# Patient Record
Sex: MALE | Race: WHITE | Employment: OTHER | URBAN - METROPOLITAN AREA
[De-identification: names, ages, dates, MRNs, and addresses within clinical notes are randomized per-mention and may not be internally consistent; named-entity substitution may affect disease eponyms.]

---

## 2017-04-10 ENCOUNTER — OFFICE VISIT (OUTPATIENT)
Dept: FAMILY MEDICINE CLINIC | Age: 77
End: 2017-04-10

## 2017-04-10 VITALS
OXYGEN SATURATION: 97 % | HEIGHT: 65 IN | RESPIRATION RATE: 18 BRPM | DIASTOLIC BLOOD PRESSURE: 68 MMHG | WEIGHT: 200 LBS | BODY MASS INDEX: 33.32 KG/M2 | SYSTOLIC BLOOD PRESSURE: 132 MMHG | HEART RATE: 72 BPM | TEMPERATURE: 98.2 F

## 2017-04-10 DIAGNOSIS — I10 ESSENTIAL HYPERTENSION: Primary | ICD-10-CM

## 2017-04-10 DIAGNOSIS — I48.20 CHRONIC ATRIAL FIBRILLATION (HCC): ICD-10-CM

## 2017-04-10 DIAGNOSIS — E78.2 MIXED HYPERLIPIDEMIA: ICD-10-CM

## 2017-04-10 DIAGNOSIS — I25.10 CORONARY ARTERY DISEASE INVOLVING NATIVE HEART WITHOUT ANGINA PECTORIS, UNSPECIFIED VESSEL OR LESION TYPE: ICD-10-CM

## 2017-04-10 PROCEDURE — 3017F COLORECTAL CA SCREEN DOC REV: CPT | Performed by: FAMILY MEDICINE

## 2017-04-10 PROCEDURE — 1123F ACP DISCUSS/DSCN MKR DOCD: CPT | Performed by: FAMILY MEDICINE

## 2017-04-10 PROCEDURE — G8598 ASA/ANTIPLAT THER USED: HCPCS | Performed by: FAMILY MEDICINE

## 2017-04-10 PROCEDURE — G8417 CALC BMI ABV UP PARAM F/U: HCPCS | Performed by: FAMILY MEDICINE

## 2017-04-10 PROCEDURE — 1036F TOBACCO NON-USER: CPT | Performed by: FAMILY MEDICINE

## 2017-04-10 PROCEDURE — 99214 OFFICE O/P EST MOD 30 MIN: CPT | Performed by: FAMILY MEDICINE

## 2017-04-10 PROCEDURE — G8427 DOCREV CUR MEDS BY ELIG CLIN: HCPCS | Performed by: FAMILY MEDICINE

## 2017-04-10 PROCEDURE — 4040F PNEUMOC VAC/ADMIN/RCVD: CPT | Performed by: FAMILY MEDICINE

## 2017-04-10 RX ORDER — TELMISARTAN 80 MG/1
80 TABLET ORAL DAILY
COMMUNITY
Start: 2017-03-04

## 2017-04-10 ASSESSMENT — ENCOUNTER SYMPTOMS
EYES NEGATIVE: 1
ALLERGIC/IMMUNOLOGIC NEGATIVE: 1
GASTROINTESTINAL NEGATIVE: 1
SHORTNESS OF BREATH: 0
RESPIRATORY NEGATIVE: 1

## 2017-05-09 ENCOUNTER — TELEPHONE (OUTPATIENT)
Dept: ADMINISTRATIVE | Age: 77
End: 2017-05-09

## 2017-10-02 DIAGNOSIS — E55.9 VITAMIN D DEFICIENCY: ICD-10-CM

## 2017-10-02 DIAGNOSIS — I10 ESSENTIAL HYPERTENSION: Primary | ICD-10-CM

## 2017-10-02 DIAGNOSIS — E29.1 TESTICULAR HYPOFUNCTION: ICD-10-CM

## 2017-10-02 DIAGNOSIS — Z12.5 PROSTATE CANCER SCREENING: ICD-10-CM

## 2017-10-02 DIAGNOSIS — E78.2 MIXED HYPERLIPIDEMIA: ICD-10-CM

## 2017-10-03 DIAGNOSIS — E29.1 TESTICULAR HYPOFUNCTION: ICD-10-CM

## 2017-10-03 DIAGNOSIS — Z12.5 PROSTATE CANCER SCREENING: ICD-10-CM

## 2017-10-03 DIAGNOSIS — E78.2 MIXED HYPERLIPIDEMIA: ICD-10-CM

## 2017-10-03 DIAGNOSIS — I10 ESSENTIAL HYPERTENSION: ICD-10-CM

## 2017-10-03 DIAGNOSIS — E55.9 VITAMIN D DEFICIENCY: ICD-10-CM

## 2017-10-03 LAB
ALBUMIN SERPL-MCNC: 4.1 G/DL (ref 3.9–4.9)
ALP BLD-CCNC: 67 U/L (ref 35–104)
ALT SERPL-CCNC: 34 U/L (ref 0–41)
ANION GAP SERPL CALCULATED.3IONS-SCNC: 15 MEQ/L (ref 7–13)
AST SERPL-CCNC: 31 U/L (ref 0–40)
BASOPHILS ABSOLUTE: 0 K/UL (ref 0–0.2)
BASOPHILS RELATIVE PERCENT: 0.6 %
BILIRUB SERPL-MCNC: 0.8 MG/DL (ref 0–1.2)
BUN BLDV-MCNC: 18 MG/DL (ref 8–23)
CALCIUM SERPL-MCNC: 8.8 MG/DL (ref 8.6–10.2)
CHLORIDE BLD-SCNC: 98 MEQ/L (ref 98–107)
CHOLESTEROL, TOTAL: 128 MG/DL (ref 0–199)
CO2: 25 MEQ/L (ref 22–29)
CREAT SERPL-MCNC: 0.81 MG/DL (ref 0.7–1.2)
EOSINOPHILS ABSOLUTE: 0.4 K/UL (ref 0–0.7)
EOSINOPHILS RELATIVE PERCENT: 5 %
GFR AFRICAN AMERICAN: >60
GFR NON-AFRICAN AMERICAN: >60
GLOBULIN: 2.9 G/DL (ref 2.3–3.5)
GLUCOSE BLD-MCNC: 99 MG/DL (ref 74–109)
HCT VFR BLD CALC: 42.2 % (ref 42–52)
HDLC SERPL-MCNC: 50 MG/DL (ref 40–59)
HEMOGLOBIN: 14.1 G/DL (ref 14–18)
LDL CHOLESTEROL CALCULATED: 60 MG/DL (ref 0–129)
LYMPHOCYTES ABSOLUTE: 2.2 K/UL (ref 1–4.8)
LYMPHOCYTES RELATIVE PERCENT: 27.7 %
MCH RBC QN AUTO: 32.1 PG (ref 27–31.3)
MCHC RBC AUTO-ENTMCNC: 33.4 % (ref 33–37)
MCV RBC AUTO: 96 FL (ref 80–100)
MONOCYTES ABSOLUTE: 1 K/UL (ref 0.2–0.8)
MONOCYTES RELATIVE PERCENT: 12.2 %
NEUTROPHILS ABSOLUTE: 4.3 K/UL (ref 1.4–6.5)
NEUTROPHILS RELATIVE PERCENT: 54.5 %
PDW BLD-RTO: 13.3 % (ref 11.5–14.5)
PLATELET # BLD: 123 K/UL (ref 130–400)
POTASSIUM SERPL-SCNC: 4.2 MEQ/L (ref 3.5–5.1)
PROSTATE SPECIFIC ANTIGEN: 3.91 NG/ML (ref 0–6.22)
RBC # BLD: 4.4 M/UL (ref 4.7–6.1)
SODIUM BLD-SCNC: 138 MEQ/L (ref 132–144)
TOTAL PROTEIN: 7 G/DL (ref 6.4–8.1)
TRIGL SERPL-MCNC: 88 MG/DL (ref 0–200)
VITAMIN D 25-HYDROXY: 37.3 NG/ML (ref 30–100)
WBC # BLD: 7.9 K/UL (ref 4.8–10.8)

## 2017-10-10 ENCOUNTER — OFFICE VISIT (OUTPATIENT)
Dept: FAMILY MEDICINE CLINIC | Age: 77
End: 2017-10-10

## 2017-10-10 VITALS
WEIGHT: 201 LBS | TEMPERATURE: 98.1 F | HEIGHT: 65 IN | SYSTOLIC BLOOD PRESSURE: 132 MMHG | DIASTOLIC BLOOD PRESSURE: 84 MMHG | HEART RATE: 74 BPM | OXYGEN SATURATION: 97 % | BODY MASS INDEX: 33.49 KG/M2 | RESPIRATION RATE: 18 BRPM

## 2017-10-10 DIAGNOSIS — Z23 NEED FOR PNEUMOCOCCAL VACCINATION: ICD-10-CM

## 2017-10-10 DIAGNOSIS — E78.2 MIXED HYPERLIPIDEMIA: ICD-10-CM

## 2017-10-10 DIAGNOSIS — I10 ESSENTIAL HYPERTENSION: Primary | ICD-10-CM

## 2017-10-10 DIAGNOSIS — I25.10 CORONARY ARTERY DISEASE INVOLVING NATIVE HEART WITHOUT ANGINA PECTORIS, UNSPECIFIED VESSEL OR LESION TYPE: ICD-10-CM

## 2017-10-10 DIAGNOSIS — Z23 NEED FOR INFLUENZA VACCINATION: ICD-10-CM

## 2017-10-10 PROCEDURE — 4040F PNEUMOC VAC/ADMIN/RCVD: CPT | Performed by: FAMILY MEDICINE

## 2017-10-10 PROCEDURE — G0008 ADMIN INFLUENZA VIRUS VAC: HCPCS | Performed by: FAMILY MEDICINE

## 2017-10-10 PROCEDURE — G8598 ASA/ANTIPLAT THER USED: HCPCS | Performed by: FAMILY MEDICINE

## 2017-10-10 PROCEDURE — G8484 FLU IMMUNIZE NO ADMIN: HCPCS | Performed by: FAMILY MEDICINE

## 2017-10-10 PROCEDURE — 3017F COLORECTAL CA SCREEN DOC REV: CPT | Performed by: FAMILY MEDICINE

## 2017-10-10 PROCEDURE — 1036F TOBACCO NON-USER: CPT | Performed by: FAMILY MEDICINE

## 2017-10-10 PROCEDURE — G8417 CALC BMI ABV UP PARAM F/U: HCPCS | Performed by: FAMILY MEDICINE

## 2017-10-10 PROCEDURE — 90662 IIV NO PRSV INCREASED AG IM: CPT | Performed by: FAMILY MEDICINE

## 2017-10-10 PROCEDURE — G8427 DOCREV CUR MEDS BY ELIG CLIN: HCPCS | Performed by: FAMILY MEDICINE

## 2017-10-10 PROCEDURE — G0009 ADMIN PNEUMOCOCCAL VACCINE: HCPCS | Performed by: FAMILY MEDICINE

## 2017-10-10 PROCEDURE — 1123F ACP DISCUSS/DSCN MKR DOCD: CPT | Performed by: FAMILY MEDICINE

## 2017-10-10 PROCEDURE — 99214 OFFICE O/P EST MOD 30 MIN: CPT | Performed by: FAMILY MEDICINE

## 2017-10-10 PROCEDURE — 90732 PPSV23 VACC 2 YRS+ SUBQ/IM: CPT | Performed by: FAMILY MEDICINE

## 2017-10-10 RX ORDER — METOPROLOL SUCCINATE 50 MG/1
50 TABLET, EXTENDED RELEASE ORAL DAILY
Status: ON HOLD | COMMUNITY
End: 2017-11-08

## 2017-10-10 ASSESSMENT — ENCOUNTER SYMPTOMS
GASTROINTESTINAL NEGATIVE: 1
ALLERGIC/IMMUNOLOGIC NEGATIVE: 1
SHORTNESS OF BREATH: 0
EYES NEGATIVE: 1
RESPIRATORY NEGATIVE: 1

## 2017-10-10 NOTE — PROGRESS NOTES
Monday wed., and Friday         potassium chloride (KLOR-CON) 10 MEQ CR tablet Take 10 mEq by mouth daily. Take one tab Monday wed and friday        No current facility-administered medications on file prior to visit. Allergies   Allergen Reactions    Clonidine Derivatives     Hydrochlorothiazide     Losartan     Nitrofuran Derivatives     Pradaxa [Dabigatran Etexilate Mesylate]     Sular [Nisoldipine]     Xarelto [Rivaroxaban] Rash     Health Maintenance   Topic Date Due    AAA screen  1940    DTaP/Tdap/Td vaccine (1 - Tdap) 04/13/1961    Zostavax vaccine  04/13/2002    Lipid screen  10/03/2022    Colon cancer screen colonoscopy  05/28/2024    Flu vaccine  Completed    Pneumococcal low/med risk  Completed       Review of Systems    Review of Systems   Constitutional: Negative for activity change, appetite change, chills, fever and unexpected weight change. HENT: Negative. Eyes: Negative. Respiratory: Negative. Negative for shortness of breath. Cardiovascular: Negative. Negative for chest pain and palpitations. Gastrointestinal: Negative. Endocrine: Negative. Genitourinary: Negative. Musculoskeletal: Negative. Skin: Negative. Allergic/Immunologic: Negative. Neurological: Negative. Hematological: Negative. Psychiatric/Behavioral: Negative. Physical Exam  Vitals:    10/10/17 0941   BP: 132/84   Pulse: 74   Resp: 18   Temp: 98.1 °F (36.7 °C)   TempSrc: Tympanic   SpO2: 97%   Weight: 201 lb (91.2 kg)   Height: 5' 5\" (1.651 m)       Physical Exam   Constitutional: He appears well-developed and well-nourished. HENT:   Right Ear: External ear normal.   Left Ear: External ear normal.   Eyes: Conjunctivae are normal. Pupils are equal, round, and reactive to light. Neck: Normal range of motion. Neck supple. No thyromegaly present. Cardiovascular: Normal rate, regular rhythm and normal heart sounds. No murmur heard.   Pulmonary/Chest: Effort normal and breath sounds normal.   Abdominal: Soft. Bowel sounds are normal.   Musculoskeletal: Normal range of motion. He exhibits no edema or tenderness. Lymphadenopathy:     He has no cervical adenopathy. Assessment  1. Essential hypertension     2. Need for influenza vaccination  INFLUENZA, HIGH DOSE, 65 YRS +, IM, PF, PREFILL SYR, 0.5ML (FLUZONE HD)    CANCELED: INFLUENZA, HIGH DOSE, 65 YRS +, IM, PF, PREFILL SYR, 0.5ML (FLUZONE HD)   3. Need for pneumococcal vaccination  Pneumococcal polysaccharide vaccine 23-valent greater than or equal to 1yo subcutaneous/IM   4. Mixed hyperlipidemia     5.  Coronary artery disease involving native heart without angina pectoris, unspecified vessel or lesion type       Problem List     Hypertension - Primary    Relevant Medications    nitroGLYCERIN (NITROSTAT) 0.4 MG SL tablet    aspirin 81 MG EC tablet    simvastatin (ZOCOR) 40 MG tablet    furosemide (LASIX) 20 MG tablet    apixaban (ELIQUIS) 5 MG TABS tablet    telmisartan (MICARDIS) 80 MG tablet    metoprolol succinate (TOPROL XL) 50 MG extended release tablet    Hyperlipidemia    Relevant Medications    nitroGLYCERIN (NITROSTAT) 0.4 MG SL tablet    aspirin 81 MG EC tablet    simvastatin (ZOCOR) 40 MG tablet    furosemide (LASIX) 20 MG tablet    apixaban (ELIQUIS) 5 MG TABS tablet    telmisartan (MICARDIS) 80 MG tablet    metoprolol succinate (TOPROL XL) 50 MG extended release tablet    CAD (coronary artery disease)    Relevant Medications    nitroGLYCERIN (NITROSTAT) 0.4 MG SL tablet    aspirin 81 MG EC tablet    simvastatin (ZOCOR) 40 MG tablet    furosemide (LASIX) 20 MG tablet    apixaban (ELIQUIS) 5 MG TABS tablet    telmisartan (MICARDIS) 80 MG tablet    metoprolol succinate (TOPROL XL) 50 MG extended release tablet          Plan  Orders Placed This Encounter   Procedures    Pneumococcal polysaccharide vaccine 23-valent greater than or equal to 1yo subcutaneous/IM    INFLUENZA, HIGH DOSE, 65 YRS +, IM, PF, PREFILL SYR, 0.5ML (FLUZONE HD)     No orders of the defined types were placed in this encounter. No Follow-up on file.   Filomena Bautista MD

## 2017-11-06 ENCOUNTER — APPOINTMENT (OUTPATIENT)
Dept: ULTRASOUND IMAGING | Age: 77
DRG: 813 | End: 2017-11-06
Payer: MEDICARE

## 2017-11-06 ENCOUNTER — HOSPITAL ENCOUNTER (INPATIENT)
Age: 77
LOS: 2 days | Discharge: HOME | DRG: 813 | End: 2017-11-08
Attending: EMERGENCY MEDICINE | Admitting: INTERNAL MEDICINE
Payer: MEDICARE

## 2017-11-06 DIAGNOSIS — L03.116 CELLULITIS OF LEFT LOWER EXTREMITY: ICD-10-CM

## 2017-11-06 DIAGNOSIS — T14.8XXA HEMATOMA: Primary | ICD-10-CM

## 2017-11-06 LAB
ALBUMIN SERPL-MCNC: 3.9 G/DL (ref 3.9–4.9)
ALP BLD-CCNC: 66 U/L (ref 35–104)
ALT SERPL-CCNC: 32 U/L (ref 0–41)
ANION GAP SERPL CALCULATED.3IONS-SCNC: 13 MEQ/L (ref 7–13)
ANION GAP SERPL CALCULATED.3IONS-SCNC: 13 MEQ/L (ref 7–13)
APTT: 29.8 SEC (ref 21.6–35.4)
AST SERPL-CCNC: 28 U/L (ref 0–40)
BILIRUB SERPL-MCNC: 0.9 MG/DL (ref 0–1.2)
BUN BLDV-MCNC: 16 MG/DL (ref 8–23)
BUN BLDV-MCNC: 18 MG/DL (ref 8–23)
CALCIUM SERPL-MCNC: 8.1 MG/DL (ref 8.6–10.2)
CALCIUM SERPL-MCNC: 8.5 MG/DL (ref 8.6–10.2)
CHLORIDE BLD-SCNC: 97 MEQ/L (ref 98–107)
CHLORIDE BLD-SCNC: 98 MEQ/L (ref 98–107)
CK MB: 8 NG/ML (ref 0–6.7)
CO2: 24 MEQ/L (ref 22–29)
CO2: 25 MEQ/L (ref 22–29)
CREAT SERPL-MCNC: 0.79 MG/DL (ref 0.7–1.2)
CREAT SERPL-MCNC: 0.87 MG/DL (ref 0.7–1.2)
CREATINE KINASE-MB INDEX: 3.5 % (ref 0–3.5)
GFR AFRICAN AMERICAN: >60
GFR AFRICAN AMERICAN: >60
GFR NON-AFRICAN AMERICAN: >60
GFR NON-AFRICAN AMERICAN: >60
GLOBULIN: 2.5 G/DL (ref 2.3–3.5)
GLUCOSE BLD-MCNC: 94 MG/DL (ref 74–109)
GLUCOSE BLD-MCNC: 94 MG/DL (ref 74–109)
HCT VFR BLD CALC: 34.9 % (ref 42–52)
HCT VFR BLD CALC: 35.1 % (ref 42–52)
HEMOGLOBIN: 12 G/DL (ref 14–18)
HEMOGLOBIN: 12 G/DL (ref 14–18)
INR BLD: 1.1
MCH RBC QN AUTO: 32 PG (ref 27–31.3)
MCH RBC QN AUTO: 32.6 PG (ref 27–31.3)
MCHC RBC AUTO-ENTMCNC: 34.1 % (ref 33–37)
MCHC RBC AUTO-ENTMCNC: 34.3 % (ref 33–37)
MCV RBC AUTO: 93.9 FL (ref 80–100)
MCV RBC AUTO: 95.2 FL (ref 80–100)
PDW BLD-RTO: 13.1 % (ref 11.5–14.5)
PDW BLD-RTO: 13.1 % (ref 11.5–14.5)
PLATELET # BLD: 100 K/UL (ref 130–400)
PLATELET # BLD: 94 K/UL (ref 130–400)
PLATELET SLIDE REVIEW: ABNORMAL
POTASSIUM SERPL-SCNC: 4.4 MEQ/L (ref 3.5–5.1)
POTASSIUM SERPL-SCNC: 4.4 MEQ/L (ref 3.5–5.1)
PROTHROMBIN TIME: 11.5 SEC (ref 8.1–13.7)
RBC # BLD: 3.66 M/UL (ref 4.7–6.1)
RBC # BLD: 3.74 M/UL (ref 4.7–6.1)
SODIUM BLD-SCNC: 134 MEQ/L (ref 132–144)
SODIUM BLD-SCNC: 136 MEQ/L (ref 132–144)
TOTAL CK: 226 U/L (ref 0–190)
TOTAL PROTEIN: 6.4 G/DL (ref 6.4–8.1)
WBC # BLD: 8 K/UL (ref 4.8–10.8)
WBC # BLD: 8.3 K/UL (ref 4.8–10.8)

## 2017-11-06 PROCEDURE — 2580000003 HC RX 258: Performed by: EMERGENCY MEDICINE

## 2017-11-06 PROCEDURE — 82553 CREATINE MB FRACTION: CPT

## 2017-11-06 PROCEDURE — 36415 COLL VENOUS BLD VENIPUNCTURE: CPT

## 2017-11-06 PROCEDURE — 87070 CULTURE OTHR SPECIMN AEROBIC: CPT

## 2017-11-06 PROCEDURE — 6360000002 HC RX W HCPCS: Performed by: INTERNAL MEDICINE

## 2017-11-06 PROCEDURE — 6360000002 HC RX W HCPCS: Performed by: EMERGENCY MEDICINE

## 2017-11-06 PROCEDURE — 82550 ASSAY OF CK (CPK): CPT

## 2017-11-06 PROCEDURE — 0Y9J3ZZ DRAINAGE OF LEFT LOWER LEG, PERCUTANEOUS APPROACH: ICD-10-PCS | Performed by: PODIATRIST

## 2017-11-06 PROCEDURE — 80053 COMPREHEN METABOLIC PANEL: CPT

## 2017-11-06 PROCEDURE — 85610 PROTHROMBIN TIME: CPT

## 2017-11-06 PROCEDURE — 6370000000 HC RX 637 (ALT 250 FOR IP): Performed by: INTERNAL MEDICINE

## 2017-11-06 PROCEDURE — 2580000003 HC RX 258: Performed by: INTERNAL MEDICINE

## 2017-11-06 PROCEDURE — 85730 THROMBOPLASTIN TIME PARTIAL: CPT

## 2017-11-06 PROCEDURE — 96374 THER/PROPH/DIAG INJ IV PUSH: CPT

## 2017-11-06 PROCEDURE — 87205 SMEAR GRAM STAIN: CPT

## 2017-11-06 PROCEDURE — 85027 COMPLETE CBC AUTOMATED: CPT

## 2017-11-06 PROCEDURE — 1210000000 HC MED SURG R&B

## 2017-11-06 PROCEDURE — 99285 EMERGENCY DEPT VISIT HI MDM: CPT

## 2017-11-06 PROCEDURE — 87040 BLOOD CULTURE FOR BACTERIA: CPT

## 2017-11-06 PROCEDURE — 93971 EXTREMITY STUDY: CPT

## 2017-11-06 RX ORDER — SODIUM CHLORIDE 0.9 % (FLUSH) 0.9 %
10 SYRINGE (ML) INJECTION EVERY 12 HOURS SCHEDULED
Status: DISCONTINUED | OUTPATIENT
Start: 2017-11-06 | End: 2017-11-08 | Stop reason: HOSPADM

## 2017-11-06 RX ORDER — DOCUSATE SODIUM 100 MG/1
100 CAPSULE, LIQUID FILLED ORAL 2 TIMES DAILY
Status: DISCONTINUED | OUTPATIENT
Start: 2017-11-06 | End: 2017-11-08 | Stop reason: HOSPADM

## 2017-11-06 RX ORDER — MORPHINE SULFATE 4 MG/ML
4 INJECTION, SOLUTION INTRAMUSCULAR; INTRAVENOUS ONCE
Status: DISCONTINUED | OUTPATIENT
Start: 2017-11-06 | End: 2017-11-06

## 2017-11-06 RX ORDER — ACETAMINOPHEN 325 MG/1
650 TABLET ORAL EVERY 4 HOURS PRN
Status: DISCONTINUED | OUTPATIENT
Start: 2017-11-06 | End: 2017-11-08 | Stop reason: HOSPADM

## 2017-11-06 RX ORDER — POTASSIUM CHLORIDE 7.45 MG/ML
10 INJECTION INTRAVENOUS PRN
Status: DISCONTINUED | OUTPATIENT
Start: 2017-11-06 | End: 2017-11-08 | Stop reason: HOSPADM

## 2017-11-06 RX ORDER — MORPHINE SULFATE 4 MG/ML
4 INJECTION, SOLUTION INTRAMUSCULAR; INTRAVENOUS ONCE
Status: DISCONTINUED | OUTPATIENT
Start: 2017-11-06 | End: 2017-11-08 | Stop reason: HOSPADM

## 2017-11-06 RX ORDER — TELMISARTAN 40 MG/1
80 TABLET ORAL DAILY
Status: DISCONTINUED | OUTPATIENT
Start: 2017-11-07 | End: 2017-11-08 | Stop reason: HOSPADM

## 2017-11-06 RX ORDER — SODIUM CHLORIDE 0.9 % (FLUSH) 0.9 %
10 SYRINGE (ML) INJECTION PRN
Status: DISCONTINUED | OUTPATIENT
Start: 2017-11-06 | End: 2017-11-08 | Stop reason: HOSPADM

## 2017-11-06 RX ORDER — MORPHINE SULFATE 2 MG/ML
2 INJECTION, SOLUTION INTRAMUSCULAR; INTRAVENOUS
Status: DISCONTINUED | OUTPATIENT
Start: 2017-11-06 | End: 2017-11-08 | Stop reason: HOSPADM

## 2017-11-06 RX ORDER — FLUTICASONE PROPIONATE 50 MCG
1 SPRAY, SUSPENSION (ML) NASAL DAILY
Status: DISCONTINUED | OUTPATIENT
Start: 2017-11-07 | End: 2017-11-08 | Stop reason: HOSPADM

## 2017-11-06 RX ORDER — HYDROCODONE BITARTRATE AND ACETAMINOPHEN 5; 325 MG/1; MG/1
1 TABLET ORAL EVERY 4 HOURS PRN
Status: DISCONTINUED | OUTPATIENT
Start: 2017-11-06 | End: 2017-11-08 | Stop reason: HOSPADM

## 2017-11-06 RX ORDER — ONDANSETRON 2 MG/ML
4 INJECTION INTRAMUSCULAR; INTRAVENOUS ONCE
Status: DISCONTINUED | OUTPATIENT
Start: 2017-11-06 | End: 2017-11-06

## 2017-11-06 RX ORDER — POTASSIUM CHLORIDE 20 MEQ/1
40 TABLET, EXTENDED RELEASE ORAL PRN
Status: DISCONTINUED | OUTPATIENT
Start: 2017-11-06 | End: 2017-11-08 | Stop reason: HOSPADM

## 2017-11-06 RX ORDER — MORPHINE SULFATE 4 MG/ML
4 INJECTION, SOLUTION INTRAMUSCULAR; INTRAVENOUS
Status: DISCONTINUED | OUTPATIENT
Start: 2017-11-06 | End: 2017-11-08 | Stop reason: HOSPADM

## 2017-11-06 RX ORDER — HYDROCODONE BITARTRATE AND ACETAMINOPHEN 5; 325 MG/1; MG/1
2 TABLET ORAL EVERY 4 HOURS PRN
Status: DISCONTINUED | OUTPATIENT
Start: 2017-11-06 | End: 2017-11-08 | Stop reason: HOSPADM

## 2017-11-06 RX ORDER — SIMVASTATIN 40 MG
40 TABLET ORAL NIGHTLY
Status: DISCONTINUED | OUTPATIENT
Start: 2017-11-06 | End: 2017-11-08 | Stop reason: HOSPADM

## 2017-11-06 RX ORDER — ONDANSETRON 2 MG/ML
4 INJECTION INTRAMUSCULAR; INTRAVENOUS EVERY 6 HOURS PRN
Status: DISCONTINUED | OUTPATIENT
Start: 2017-11-06 | End: 2017-11-08 | Stop reason: HOSPADM

## 2017-11-06 RX ORDER — POTASSIUM CHLORIDE 20MEQ/15ML
40 LIQUID (ML) ORAL PRN
Status: DISCONTINUED | OUTPATIENT
Start: 2017-11-06 | End: 2017-11-08 | Stop reason: HOSPADM

## 2017-11-06 RX ORDER — SODIUM CHLORIDE 9 MG/ML
INJECTION, SOLUTION INTRAVENOUS CONTINUOUS
Status: DISCONTINUED | OUTPATIENT
Start: 2017-11-06 | End: 2017-11-07

## 2017-11-06 RX ORDER — ASPIRIN 81 MG/1
81 TABLET ORAL DAILY
Status: DISCONTINUED | OUTPATIENT
Start: 2017-11-07 | End: 2017-11-08 | Stop reason: HOSPADM

## 2017-11-06 RX ORDER — METOPROLOL SUCCINATE 100 MG/1
100 TABLET, EXTENDED RELEASE ORAL DAILY
Status: DISCONTINUED | OUTPATIENT
Start: 2017-11-07 | End: 2017-11-07

## 2017-11-06 RX ADMIN — HYDROCODONE BITARTRATE AND ACETAMINOPHEN 2 TABLET: 5; 325 TABLET ORAL at 14:56

## 2017-11-06 RX ADMIN — Medication 4 MG: at 15:49

## 2017-11-06 RX ADMIN — SIMVASTATIN 40 MG: 40 TABLET, FILM COATED ORAL at 21:38

## 2017-11-06 RX ADMIN — WATER 1 G: 1 INJECTION INTRAMUSCULAR; INTRAVENOUS; SUBCUTANEOUS at 10:11

## 2017-11-06 RX ADMIN — SODIUM CHLORIDE, PRESERVATIVE FREE 10 ML: 5 INJECTION INTRAVENOUS at 21:39

## 2017-11-06 ASSESSMENT — PAIN SCALES - GENERAL
PAINLEVEL_OUTOF10: 7
PAINLEVEL_OUTOF10: 6
PAINLEVEL_OUTOF10: 2

## 2017-11-06 ASSESSMENT — PAIN DESCRIPTION - ORIENTATION: ORIENTATION: LEFT;LOWER

## 2017-11-06 ASSESSMENT — PAIN DESCRIPTION - DESCRIPTORS: DESCRIPTORS: DISCOMFORT;SORE

## 2017-11-06 ASSESSMENT — ENCOUNTER SYMPTOMS
FACIAL SWELLING: 0
VOMITING: 0
SHORTNESS OF BREATH: 0
ABDOMINAL PAIN: 0
COLOR CHANGE: 0
RHINORRHEA: 0
EYE DISCHARGE: 0

## 2017-11-06 ASSESSMENT — PAIN DESCRIPTION - LOCATION: LOCATION: LEG

## 2017-11-06 NOTE — ED PROVIDER NOTES
Osteoarthritis     Post-nasal drip          SURGICAL HISTORY       Past Surgical History:   Procedure Laterality Date    COLONOSCOPY  5/28/14    [facility]    CORONARY ANGIOPLASTY WITH STENT PLACEMENT      HERNIA REPAIR      VASECTOMY           CURRENT MEDICATIONS       Previous Medications    APIXABAN (ELIQUIS) 5 MG TABS TABLET        ASPIRIN 81 MG EC TABLET    Take 81 mg by mouth daily. CETIRIZINE (ZYRTEC) 5 MG TABLET    Take 5 mg by mouth daily    FUROSEMIDE (LASIX) 20 MG TABLET    Take 20 mg by mouth See Admin Instructions. Take one tab on Monday wed., and Friday       METOPROLOL SUCCINATE (TOPROL XL) 50 MG EXTENDED RELEASE TABLET    Take 50 mg by mouth daily 1.5 tabs daily    MISC NATURAL PRODUCTS (BLACK CHERRY CONCENTRATE PO)    Take by mouth    NITROGLYCERIN (NITROSTAT) 0.4 MG SL TABLET    Place 0.4 mg under the tongue every 5 minutes as needed. OMEGA-3 FATTY ACIDS (FISH OIL PO)    Take by mouth    POTASSIUM CHLORIDE (KLOR-CON) 10 MEQ CR TABLET    Take 10 mEq by mouth daily. Take one tab Monday wed and friday     SIMVASTATIN (ZOCOR) 40 MG TABLET    Take 40 mg by mouth nightly. TELMISARTAN (MICARDIS) 80 MG TABLET           ALLERGIES     Clonidine derivatives; Hydrochlorothiazide; Losartan; Nitrofuran derivatives; Pradaxa [dabigatran etexilate mesylate];  Sular [nisoldipine]; and Xarelto [rivaroxaban]    FAMILY HISTORY       Family History   Problem Relation Age of Onset    Heart Disease Father     COPD Father     Other Mother      Myasthenia Gravis    Heart Disease Sister     Other Daughter      MVA          SOCIAL HISTORY       Social History     Social History    Marital status:      Spouse name: N/A    Number of children: N/A    Years of education: N/A     Social History Main Topics    Smoking status: Former Smoker     Quit date: 1/1/2001    Smokeless tobacco: Never Used    Alcohol use Yes      Comment: Occasionally    Drug use: Unknown    Sexual activity: Not Asked Other Topics Concern    None     Social History Narrative    None       SCREENINGS             PHYSICAL EXAM    (up to 7 for level 4, 8 or more for level 5)     ED Triage Vitals [11/06/17 0830]   BP Temp Temp Source Pulse Resp SpO2 Height Weight   (!) 187/85 97.8 °F (36.6 °C) Oral 100 18 95 % 5' 5\" (1.651 m) 201 lb (91.2 kg)       Physical Exam   Constitutional: He is oriented to person, place, and time. He appears well-developed and well-nourished. HENT:   Head: Normocephalic and atraumatic. Eyes: Conjunctivae and EOM are normal. Pupils are equal, round, and reactive to light. Neck: Normal range of motion. Neck supple. Cardiovascular: Normal rate. Pulmonary/Chest: Effort normal.   Abdominal: Soft. Bowel sounds are normal.   Musculoskeletal: Normal range of motion. He exhibits edema. Left lower extremity   Neurological: He is alert and oriented to person, place, and time. He has normal reflexes. Skin: Skin is warm and dry. Patient has an infected hematoma at the anterolateral aspect of his left leg with significant erythema, tenderness, induration, and edema. There is gravity drainage pattern bruising noted down into his foot with the edema extending from the knee to the distal toes. Sensation is intact distally and he has pitting edema with 2 out of 4 pulses at bilateral pedal sites. Psychiatric: He has a normal mood and affect. DIAGNOSTIC RESULTS     EKG: All EKG's are interpreted by the Emergency Department Physician who either signs or Co-signs this chart in the absence of a cardiologist.        RADIOLOGY:   Non-plain film images such as CT, Ultrasound and MRI are read by the radiologist. Plain radiographic images are visualized and preliminarily interpreted by the emergency physician with the below findings:        Interpretation per the Radiologist below, if available at the time of this note:    US DUP LOWER EXTREMITY LEFT HUGO   Final Result   1.  NEGATIVE FOR VENOUS THROMBOSIS No medications on file          (Please note that portions of this note were completed with a voice recognition program.  Efforts were made to edit the dictations but occasionally words are mis-transcribed.)    Ruby Weiss DO (electronically signed)  Attending Emergency Physician          Ruby Weiss DO  11/06/17 1026

## 2017-11-06 NOTE — ED NOTES
Pt denies any pain. Refuses Morphine at this time. Will inform Dr Barber. Pt tolerated IV ATB push well. Resting quietly with lights dimmed. Spouse at bedside.       Elba Dixon RN  11/06/17 7131

## 2017-11-06 NOTE — H&P
Hospital Medicine History & Physical      PCP: Radha Wheeler MD    Date of Admission: 11/6/2017      Chief Complaint:  Left leg swelling and pain x 4 days      History Of Present Illness:     78 y.o. male who presented to [facility] with c/o swelling and pain in left leg. Pt states he hit leg on the edge of a TV stand 4 days and there has been increased swelling, redness and intermittent pain and drainage from  leg. He denies any fever or chills. Past Medical History:          Diagnosis Date    CAD (coronary artery disease)     Gout     Hyperlipidemia     Hypertension     Osteoarthritis     Post-nasal drip        Past Surgical History:          Procedure Laterality Date    COLONOSCOPY  5/28/14    [facility]    CORONARY ANGIOPLASTY WITH STENT PLACEMENT      HERNIA REPAIR      VASECTOMY         Medications Prior to Admission:      Prior to Admission medications    Medication Sig Start Date End Date Taking? Authorizing Provider   metoprolol succinate (TOPROL XL) 50 MG extended release tablet Take 50 mg by mouth daily 1.5 tabs daily   Yes Historical Provider, MD   apixaban (ELIQUIS) 5 MG TABS tablet  5/9/15  Yes Historical Provider, MD   telmisartan (MICARDIS) 80 MG tablet  3/4/17  Yes Historical Provider, MD   Misc Natural Products (BLACK CHERRY CONCENTRATE PO) Take by mouth   Yes Historical Provider, MD   cetirizine (ZYRTEC) 5 MG tablet Take 5 mg by mouth daily   Yes Historical Provider, MD   Omega-3 Fatty Acids (FISH OIL PO) Take by mouth   Yes Historical Provider, MD   nitroGLYCERIN (NITROSTAT) 0.4 MG SL tablet Place 0.4 mg under the tongue every 5 minutes as needed. Yes Historical Provider, MD   simvastatin (ZOCOR) 40 MG tablet Take 40 mg by mouth nightly. Yes Historical Provider, MD   furosemide (LASIX) 20 MG tablet Take 20 mg by mouth See Admin Instructions.  Take one tab on Monday wed., and Friday      Yes Historical Provider, MD   potassium chloride (KLOR-CON) 10 MEQ CR tablet Take 10 mEq by mouth daily. Take one tab Monday wed and friday    Yes Historical Provider, MD   aspirin 81 MG EC tablet Take 81 mg by mouth daily. Historical Provider, MD       Allergies:  Clonidine derivatives; Hydrochlorothiazide; Losartan; Nitrofuran derivatives; Pradaxa [dabigatran etexilate mesylate]; Sular [nisoldipine]; and Xarelto [rivaroxaban]    Social History:      The patient currently lives @ home    TOBACCO:   reports that he quit smoking about 16 years ago. He has never used smokeless tobacco.  ETOH:   reports that he drinks alcohol. Family History:      Reviewed in detail and negative for DM, CAD, Cancer, CVA. Positive as follows:        Problem Relation Age of Onset    Heart Disease Father     COPD Father     Other Mother      Myasthenia Gravis    Heart Disease Sister     Other Daughter      MVA       REVIEW OF SYSTEMS:   Pertinent positives as noted in the HPI. All other systems reviewed and negative. PHYSICAL EXAM:    BP (!) 151/75   Pulse 73   Temp 97.8 °F (36.6 °C) (Oral)   Resp 18   Ht 5' 5\" (1.651 m)   Wt 201 lb (91.2 kg)   SpO2 99%   BMI 33.45 kg/m²     General appearance:  No apparent distress, appears stated age and cooperative. HEENT:  Normal cephalic, atraumatic without obvious deformity. Pupils equal, round, and reactive to light. Extra ocular muscles intact. Conjunctivae/corneas clear. Neck: Supple, with full range of motion. No jugular venous distention. Trachea midline. Respiratory:  Normal respiratory effort. Clear to auscultation, bilaterally without Rales/Wheezes/Rhonchi. Cardiovascular:  Regular rate and rhythm with normal S1/S2 without murmurs, rubs or gallops. Abdomen: Soft, non-tender, non-distended with normal bowel sounds. Musculoskeletal: 3+ edema in LLE. Full range of motion without deformity.   Skin:tenderness,  erythema, warmth, blister  and hematoma in left lateral lower extremity   Neurologic:  Neurovascularly intact without any focal

## 2017-11-06 NOTE — PROGRESS NOTES
Pt arrived, wife at side. Pleasant and cooperative.  Electronically signed by Omid Hicks RN on 11/6/2017 at 12:30 PM

## 2017-11-06 NOTE — Clinical Note
Patient Class: Observation [104]   REQUIRED: Diagnosis: Traumatic hematoma [364188]   Estimated Length of Stay: Estimated stay of less than 2 midnights   Future Attending Provider: Adina Carver [[id number]]   Admitting Provider: Adina Carver [[id number]]   Telemetry Bed Required?: No

## 2017-11-07 ENCOUNTER — ANESTHESIA (OUTPATIENT)
Dept: OPERATING ROOM | Age: 77
DRG: 813 | End: 2017-11-07
Payer: MEDICARE

## 2017-11-07 ENCOUNTER — ANESTHESIA EVENT (OUTPATIENT)
Dept: OPERATING ROOM | Age: 77
DRG: 813 | End: 2017-11-07
Payer: MEDICARE

## 2017-11-07 VITALS — DIASTOLIC BLOOD PRESSURE: 60 MMHG | OXYGEN SATURATION: 85 % | TEMPERATURE: 98.1 F | SYSTOLIC BLOOD PRESSURE: 108 MMHG

## 2017-11-07 PROBLEM — S80.12XD HEMATOMA OF LEG, LEFT, SUBSEQUENT ENCOUNTER: Status: ACTIVE | Noted: 2017-11-07

## 2017-11-07 LAB
ANION GAP SERPL CALCULATED.3IONS-SCNC: 14 MEQ/L (ref 7–13)
BASOPHILS ABSOLUTE: 0.1 K/UL (ref 0–0.2)
BASOPHILS RELATIVE PERCENT: 0.6 %
BUN BLDV-MCNC: 15 MG/DL (ref 8–23)
CALCIUM SERPL-MCNC: 8.6 MG/DL (ref 8.6–10.2)
CHLORIDE BLD-SCNC: 95 MEQ/L (ref 98–107)
CO2: 25 MEQ/L (ref 22–29)
CREAT SERPL-MCNC: 0.71 MG/DL (ref 0.7–1.2)
EKG ATRIAL RATE: 82 BPM
EKG Q-T INTERVAL: 390 MS
EKG QRS DURATION: 92 MS
EKG QTC CALCULATION (BAZETT): 474 MS
EKG R AXIS: -17 DEGREES
EKG T AXIS: 2 DEGREES
EKG VENTRICULAR RATE: 89 BPM
EOSINOPHILS ABSOLUTE: 0.2 K/UL (ref 0–0.7)
EOSINOPHILS RELATIVE PERCENT: 2.3 %
GFR AFRICAN AMERICAN: >60
GFR NON-AFRICAN AMERICAN: >60
GLUCOSE BLD-MCNC: 93 MG/DL (ref 74–109)
HCT VFR BLD CALC: 36.2 % (ref 42–52)
HEMOGLOBIN: 12.3 G/DL (ref 14–18)
LACTIC ACID: 1.3 MMOL/L (ref 0.5–2.2)
LYMPHOCYTES ABSOLUTE: 2.1 K/UL (ref 1–4.8)
LYMPHOCYTES RELATIVE PERCENT: 20.3 %
MCH RBC QN AUTO: 32.6 PG (ref 27–31.3)
MCHC RBC AUTO-ENTMCNC: 34.1 % (ref 33–37)
MCV RBC AUTO: 95.7 FL (ref 80–100)
MONOCYTES ABSOLUTE: 1.1 K/UL (ref 0.2–0.8)
MONOCYTES RELATIVE PERCENT: 11.1 %
NEUTROPHILS ABSOLUTE: 6.7 K/UL (ref 1.4–6.5)
NEUTROPHILS RELATIVE PERCENT: 65.7 %
PDW BLD-RTO: 13.1 % (ref 11.5–14.5)
PLATELET # BLD: 103 K/UL (ref 130–400)
PLATELET SLIDE REVIEW: ABNORMAL
POTASSIUM SERPL-SCNC: 4.3 MEQ/L (ref 3.5–5.1)
RBC # BLD: 3.79 M/UL (ref 4.7–6.1)
SLIDE REVIEW: ABNORMAL
SODIUM BLD-SCNC: 134 MEQ/L (ref 132–144)
TOTAL CK: 163 U/L (ref 0–190)
WBC # BLD: 10.2 K/UL (ref 4.8–10.8)

## 2017-11-07 PROCEDURE — 0JCP0ZZ EXTIRPATION OF MATTER FROM LEFT LOWER LEG SUBCUTANEOUS TISSUE AND FASCIA, OPEN APPROACH: ICD-10-PCS | Performed by: SURGERY

## 2017-11-07 PROCEDURE — 6360000002 HC RX W HCPCS: Performed by: INTERNAL MEDICINE

## 2017-11-07 PROCEDURE — 87205 SMEAR GRAM STAIN: CPT

## 2017-11-07 PROCEDURE — 6370000000 HC RX 637 (ALT 250 FOR IP): Performed by: INTERNAL MEDICINE

## 2017-11-07 PROCEDURE — 80048 BASIC METABOLIC PNL TOTAL CA: CPT

## 2017-11-07 PROCEDURE — 2500000003 HC RX 250 WO HCPCS: Performed by: NURSE ANESTHETIST, CERTIFIED REGISTERED

## 2017-11-07 PROCEDURE — 7100000001 HC PACU RECOVERY - ADDTL 15 MIN: Performed by: SURGERY

## 2017-11-07 PROCEDURE — 2500000003 HC RX 250 WO HCPCS: Performed by: SURGERY

## 2017-11-07 PROCEDURE — 7100000000 HC PACU RECOVERY - FIRST 15 MIN: Performed by: SURGERY

## 2017-11-07 PROCEDURE — 2580000003 HC RX 258: Performed by: INTERNAL MEDICINE

## 2017-11-07 PROCEDURE — 3700000001 HC ADD 15 MINUTES (ANESTHESIA): Performed by: SURGERY

## 2017-11-07 PROCEDURE — 3600000002 HC SURGERY LEVEL 2 BASE: Performed by: SURGERY

## 2017-11-07 PROCEDURE — 36415 COLL VENOUS BLD VENIPUNCTURE: CPT

## 2017-11-07 PROCEDURE — 6360000002 HC RX W HCPCS: Performed by: NURSE ANESTHETIST, CERTIFIED REGISTERED

## 2017-11-07 PROCEDURE — 83605 ASSAY OF LACTIC ACID: CPT

## 2017-11-07 PROCEDURE — 3700000000 HC ANESTHESIA ATTENDED CARE: Performed by: SURGERY

## 2017-11-07 PROCEDURE — 87070 CULTURE OTHR SPECIMN AEROBIC: CPT

## 2017-11-07 PROCEDURE — 3600000012 HC SURGERY LEVEL 2 ADDTL 15MIN: Performed by: SURGERY

## 2017-11-07 PROCEDURE — 87075 CULTR BACTERIA EXCEPT BLOOD: CPT

## 2017-11-07 PROCEDURE — 93005 ELECTROCARDIOGRAM TRACING: CPT

## 2017-11-07 PROCEDURE — 85025 COMPLETE CBC W/AUTO DIFF WBC: CPT

## 2017-11-07 PROCEDURE — 2580000003 HC RX 258: Performed by: NURSE ANESTHETIST, CERTIFIED REGISTERED

## 2017-11-07 PROCEDURE — 2580000003 HC RX 258: Performed by: SURGERY

## 2017-11-07 PROCEDURE — 82550 ASSAY OF CK (CPK): CPT

## 2017-11-07 PROCEDURE — 1210000000 HC MED SURG R&B

## 2017-11-07 PROCEDURE — 2700000000 HC OXYGEN THERAPY PER DAY

## 2017-11-07 PROCEDURE — 2580000003 HC RX 258: Performed by: ANESTHESIOLOGY

## 2017-11-07 RX ORDER — LIDOCAINE HYDROCHLORIDE 10 MG/ML
1 INJECTION, SOLUTION EPIDURAL; INFILTRATION; INTRACAUDAL; PERINEURAL
Status: DISCONTINUED | OUTPATIENT
Start: 2017-11-07 | End: 2017-11-07 | Stop reason: HOSPADM

## 2017-11-07 RX ORDER — MAGNESIUM HYDROXIDE 1200 MG/15ML
LIQUID ORAL CONTINUOUS PRN
Status: DISCONTINUED | OUTPATIENT
Start: 2017-11-07 | End: 2017-11-07 | Stop reason: HOSPADM

## 2017-11-07 RX ORDER — HYDROCODONE BITARTRATE AND ACETAMINOPHEN 5; 325 MG/1; MG/1
2 TABLET ORAL PRN
Status: DISCONTINUED | OUTPATIENT
Start: 2017-11-07 | End: 2017-11-07 | Stop reason: HOSPADM

## 2017-11-07 RX ORDER — DIPHENHYDRAMINE HYDROCHLORIDE 50 MG/ML
12.5 INJECTION INTRAMUSCULAR; INTRAVENOUS
Status: DISCONTINUED | OUTPATIENT
Start: 2017-11-07 | End: 2017-11-07 | Stop reason: HOSPADM

## 2017-11-07 RX ORDER — LIDOCAINE HYDROCHLORIDE 20 MG/ML
INJECTION, SOLUTION INFILTRATION; PERINEURAL PRN
Status: DISCONTINUED | OUTPATIENT
Start: 2017-11-07 | End: 2017-11-07 | Stop reason: SDUPTHER

## 2017-11-07 RX ORDER — FENTANYL CITRATE 50 UG/ML
50 INJECTION, SOLUTION INTRAMUSCULAR; INTRAVENOUS EVERY 10 MIN PRN
Status: DISCONTINUED | OUTPATIENT
Start: 2017-11-07 | End: 2017-11-07 | Stop reason: HOSPADM

## 2017-11-07 RX ORDER — ONDANSETRON 2 MG/ML
4 INJECTION INTRAMUSCULAR; INTRAVENOUS
Status: DISCONTINUED | OUTPATIENT
Start: 2017-11-07 | End: 2017-11-07 | Stop reason: HOSPADM

## 2017-11-07 RX ORDER — FENTANYL CITRATE 50 UG/ML
INJECTION, SOLUTION INTRAMUSCULAR; INTRAVENOUS PRN
Status: DISCONTINUED | OUTPATIENT
Start: 2017-11-07 | End: 2017-11-07 | Stop reason: SDUPTHER

## 2017-11-07 RX ORDER — ONDANSETRON 2 MG/ML
INJECTION INTRAMUSCULAR; INTRAVENOUS PRN
Status: DISCONTINUED | OUTPATIENT
Start: 2017-11-07 | End: 2017-11-07 | Stop reason: SDUPTHER

## 2017-11-07 RX ORDER — HYDRALAZINE HYDROCHLORIDE 20 MG/ML
10 INJECTION INTRAMUSCULAR; INTRAVENOUS EVERY 4 HOURS PRN
Status: DISCONTINUED | OUTPATIENT
Start: 2017-11-07 | End: 2017-11-08 | Stop reason: HOSPADM

## 2017-11-07 RX ORDER — MEPERIDINE HYDROCHLORIDE 25 MG/ML
12.5 INJECTION INTRAMUSCULAR; INTRAVENOUS; SUBCUTANEOUS EVERY 5 MIN PRN
Status: DISCONTINUED | OUTPATIENT
Start: 2017-11-07 | End: 2017-11-07 | Stop reason: HOSPADM

## 2017-11-07 RX ORDER — SODIUM CHLORIDE, SODIUM LACTATE, POTASSIUM CHLORIDE, CALCIUM CHLORIDE 600; 310; 30; 20 MG/100ML; MG/100ML; MG/100ML; MG/100ML
INJECTION, SOLUTION INTRAVENOUS CONTINUOUS
Status: DISCONTINUED | OUTPATIENT
Start: 2017-11-07 | End: 2017-11-07

## 2017-11-07 RX ORDER — SODIUM CHLORIDE, SODIUM LACTATE, POTASSIUM CHLORIDE, CALCIUM CHLORIDE 600; 310; 30; 20 MG/100ML; MG/100ML; MG/100ML; MG/100ML
INJECTION, SOLUTION INTRAVENOUS CONTINUOUS PRN
Status: DISCONTINUED | OUTPATIENT
Start: 2017-11-07 | End: 2017-11-07 | Stop reason: SDUPTHER

## 2017-11-07 RX ORDER — SODIUM CHLORIDE 0.9 % (FLUSH) 0.9 %
10 SYRINGE (ML) INJECTION EVERY 12 HOURS SCHEDULED
Status: DISCONTINUED | OUTPATIENT
Start: 2017-11-07 | End: 2017-11-07 | Stop reason: HOSPADM

## 2017-11-07 RX ORDER — BUPIVACAINE HYDROCHLORIDE 2.5 MG/ML
INJECTION, SOLUTION EPIDURAL; INFILTRATION; INTRACAUDAL PRN
Status: DISCONTINUED | OUTPATIENT
Start: 2017-11-07 | End: 2017-11-07 | Stop reason: HOSPADM

## 2017-11-07 RX ORDER — HYDROCODONE BITARTRATE AND ACETAMINOPHEN 5; 325 MG/1; MG/1
1 TABLET ORAL PRN
Status: DISCONTINUED | OUTPATIENT
Start: 2017-11-07 | End: 2017-11-07 | Stop reason: HOSPADM

## 2017-11-07 RX ORDER — METOCLOPRAMIDE HYDROCHLORIDE 5 MG/ML
10 INJECTION INTRAMUSCULAR; INTRAVENOUS
Status: DISCONTINUED | OUTPATIENT
Start: 2017-11-07 | End: 2017-11-07 | Stop reason: HOSPADM

## 2017-11-07 RX ORDER — METOPROLOL SUCCINATE 50 MG/1
50 TABLET, EXTENDED RELEASE ORAL DAILY
Status: DISCONTINUED | OUTPATIENT
Start: 2017-11-08 | End: 2017-11-08 | Stop reason: HOSPADM

## 2017-11-07 RX ORDER — SODIUM CHLORIDE 0.9 % (FLUSH) 0.9 %
10 SYRINGE (ML) INJECTION PRN
Status: DISCONTINUED | OUTPATIENT
Start: 2017-11-07 | End: 2017-11-07 | Stop reason: HOSPADM

## 2017-11-07 RX ORDER — PROPOFOL 10 MG/ML
INJECTION, EMULSION INTRAVENOUS PRN
Status: DISCONTINUED | OUTPATIENT
Start: 2017-11-07 | End: 2017-11-07 | Stop reason: SDUPTHER

## 2017-11-07 RX ADMIN — FENTANYL CITRATE 50 MCG: 50 INJECTION, SOLUTION INTRAMUSCULAR; INTRAVENOUS at 16:13

## 2017-11-07 RX ADMIN — ASPIRIN 81 MG: 81 TABLET, COATED ORAL at 07:59

## 2017-11-07 RX ADMIN — ONDANSETRON 4 MG: 2 INJECTION INTRAMUSCULAR; INTRAVENOUS at 16:51

## 2017-11-07 RX ADMIN — SODIUM CHLORIDE, PRESERVATIVE FREE 10 ML: 5 INJECTION INTRAVENOUS at 20:14

## 2017-11-07 RX ADMIN — SODIUM CHLORIDE, POTASSIUM CHLORIDE, SODIUM LACTATE AND CALCIUM CHLORIDE: 600; 310; 30; 20 INJECTION, SOLUTION INTRAVENOUS at 16:09

## 2017-11-07 RX ADMIN — PROPOFOL 30 MG: 10 INJECTION, EMULSION INTRAVENOUS at 16:14

## 2017-11-07 RX ADMIN — HYDROCODONE BITARTRATE AND ACETAMINOPHEN 1 TABLET: 5; 325 TABLET ORAL at 04:03

## 2017-11-07 RX ADMIN — SIMVASTATIN 40 MG: 40 TABLET, FILM COATED ORAL at 20:14

## 2017-11-07 RX ADMIN — SODIUM CHLORIDE: 9 INJECTION, SOLUTION INTRAVENOUS at 02:10

## 2017-11-07 RX ADMIN — DOCUSATE SODIUM 100 MG: 100 CAPSULE, LIQUID FILLED ORAL at 20:10

## 2017-11-07 RX ADMIN — HYDRALAZINE HYDROCHLORIDE 10 MG: 20 INJECTION INTRAMUSCULAR; INTRAVENOUS at 04:30

## 2017-11-07 RX ADMIN — PHENYLEPHRINE HYDROCHLORIDE 100 MCG: 10 INJECTION INTRAVENOUS at 16:38

## 2017-11-07 RX ADMIN — METOPROLOL SUCCINATE 100 MG: 100 TABLET, EXTENDED RELEASE ORAL at 07:59

## 2017-11-07 RX ADMIN — PROPOFOL 100 MG: 10 INJECTION, EMULSION INTRAVENOUS at 16:13

## 2017-11-07 RX ADMIN — FENTANYL CITRATE 50 MCG: 50 INJECTION, SOLUTION INTRAMUSCULAR; INTRAVENOUS at 16:33

## 2017-11-07 RX ADMIN — HYDROCODONE BITARTRATE AND ACETAMINOPHEN 2 TABLET: 5; 325 TABLET ORAL at 20:10

## 2017-11-07 RX ADMIN — HYDROCODONE BITARTRATE AND ACETAMINOPHEN 2 TABLET: 5; 325 TABLET ORAL at 07:58

## 2017-11-07 RX ADMIN — LIDOCAINE HYDROCHLORIDE 5 ML: 20 INJECTION, SOLUTION INFILTRATION; PERINEURAL at 16:13

## 2017-11-07 RX ADMIN — SODIUM CHLORIDE, POTASSIUM CHLORIDE, SODIUM LACTATE AND CALCIUM CHLORIDE: 600; 310; 30; 20 INJECTION, SOLUTION INTRAVENOUS at 14:12

## 2017-11-07 ASSESSMENT — PAIN DESCRIPTION - LOCATION: LOCATION: LEG

## 2017-11-07 ASSESSMENT — PAIN DESCRIPTION - PAIN TYPE: TYPE: ACUTE PAIN

## 2017-11-07 ASSESSMENT — PAIN SCALES - GENERAL
PAINLEVEL_OUTOF10: 10
PAINLEVEL_OUTOF10: 4
PAINLEVEL_OUTOF10: 10

## 2017-11-07 NOTE — CARE COORDINATION
MET WITH PATIENT, FREEDOM OF CHOICE OFFERED. STATES FROM HOME WITH WIFE. OFFERED HOME CARE TO HELP WITH DRESSING CHANGES IF NEEDED. PATIENT STATES HIS WIFE IS ABLE TO CHANGE DRESSINGS. DECLINES DC NEEDS AT THIS TIME. PLAN IS EVACUATION OF HEMATOMA TODAY WITH DR. Maria Ines Ashley.  WILL FOLLOW FOR POTENTIAL NEEDS

## 2017-11-07 NOTE — PROGRESS NOTES
(!) 140/82 (!) 152/65   Pulse: 87  110 (!) 41   Resp:    17   Temp: 97.3 °F (36.3 °C)   98.1 °F (36.7 °C)   TempSrc:    Oral   SpO2: 98%   97%   Weight:  205 lb 6.4 oz (93.2 kg)     Height:         General appearance: Mild acute distress, A + O x 3. No conversational dyspnea noted. HEENT: Moist buccal mucosa, trachea midline. Anicteric sclera. Chest: No chest wall tenderness. No use of accessory muscles  Lungs: CTAB   Heart:  S1, S2 normal, RRR, no MRG appreciated  Abdomen: (+) BS, soft, non-tender; non distended, no guarding or rigidity noted  Extremities:  no cyanosis, No edema bilat lower exts.  Dressing C/D/I      Medications:      sodium chloride 75 mL/hr at 11/07/17 0210      sodium chloride flush  10 mL Intravenous 2 times per day    docusate sodium  100 mg Oral BID    morphine  4 mg Intravenous Once    simvastatin  40 mg Oral Nightly    telmisartan  80 mg Oral Daily    fluticasone  1 spray Each Nare Daily    aspirin  81 mg Oral Daily    metoprolol succinate  100 mg Oral Daily       LABS Reviewed    IMAGING Reviewed    Sarah Garcia MD  [facility] Hospitalist  basil

## 2017-11-07 NOTE — ANESTHESIA PRE PROCEDURE
Department of Anesthesiology  Preprocedure Note       Name:  Malcolm Meneses   Age:  78 y.o.  :  1940                                          MRN:  [id number]         Date:  2017      Surgeon: Leotis Paget):  Renny Juarez MD    Procedure: Procedure(s):  EVACUATION HEMATOMA LEFT LEG    Medications prior to admission:   Prior to Admission medications    Medication Sig Start Date End Date Taking? Authorizing Provider   metoprolol succinate (TOPROL XL) 50 MG extended release tablet Take 50 mg by mouth daily 1.5 tabs daily   Yes Historical Provider, MD   apixaban (ELIQUIS) 5 MG TABS tablet  5/9/15  Yes Historical Provider, MD   telmisartan (MICARDIS) 80 MG tablet 80 mg daily  3/4/17  Yes Historical Provider, MD   Misc Natural Products (BLACK CHERRY CONCENTRATE PO) Take by mouth   Yes Historical Provider, MD   cetirizine (ZYRTEC) 5 MG tablet Take 5 mg by mouth daily   Yes Historical Provider, MD   Omega-3 Fatty Acids (FISH OIL PO) Take by mouth   Yes Historical Provider, MD   nitroGLYCERIN (NITROSTAT) 0.4 MG SL tablet Place 0.4 mg under the tongue every 5 minutes as needed. Yes Historical Provider, MD   simvastatin (ZOCOR) 40 MG tablet Take 40 mg by mouth nightly. Yes Historical Provider, MD   furosemide (LASIX) 20 MG tablet Take 20 mg by mouth See Admin Instructions. Take one tab on ., and Friday      Yes Historical Provider, MD   potassium chloride (KLOR-CON) 10 MEQ CR tablet Take 10 mEq by mouth daily. Take one tab  and friday    Yes Historical Provider, MD   aspirin 81 MG EC tablet Take 81 mg by mouth daily.       Historical Provider, MD       Current medications:    Current Facility-Administered Medications   Medication Dose Route Frequency Provider Last Rate Last Dose    hydrALAZINE (APRESOLINE) injection 10 mg  10 mg Intravenous Q4H PRN Nory Sosa DO   10 mg at 17 0430    [START ON 2017] metoprolol succinate (TOPROL XL) extended release tablet 50 mg  50 mg Oral MD Graham        potassium chloride 10 mEq/100 mL IVPB (Peripheral Line)  10 mEq Intravenous PRN Alan Wagner MD        magnesium sulfate 1 g in dextrose 5 % 100 mL IVPB  1 g Intravenous PRN Alan Wagner MD        acetaminophen (TYLENOL) tablet 650 mg  650 mg Oral Q4H PRN Alan Wagner MD        HYDROcodone-acetaminophen (NORCO) 5-325 MG per tablet 1 tablet  1 tablet Oral Q4H PRN Alan Wagner MD   1 tablet at 11/07/17 0403    Or    HYDROcodone-acetaminophen (NORCO) 5-325 MG per tablet 2 tablet  2 tablet Oral Q4H PRN Alan Wagner MD   2 tablet at 11/07/17 0758    morphine injection 2 mg  2 mg Intravenous Q2H PRN Alan Wagner MD        Or    morphine injection 4 mg  4 mg Intravenous Q2H PRN Alan Wagner MD   4 mg at 11/06/17 1549    magnesium hydroxide (MILK OF MAGNESIA) 400 MG/5ML suspension 30 mL  30 mL Oral Daily PRN Alan Wagner MD        docusate sodium (COLACE) capsule 100 mg  100 mg Oral BID Katina Stevenson MD        ondansetron (ZOFRAN) injection 4 mg  4 mg Intravenous Q6H PRN Alan Wagner MD        morphine injection 4 mg  4 mg Intravenous Once Alan Wagner MD        simvastatin (ZOCOR) tablet 40 mg  40 mg Oral Nightly Quan SCHAEFFER Sedar, DO   40 mg at 11/06/17 2138    telmisartan (MICARDIS) tablet 80 mg  80 mg Oral Daily Quan Sosa, DO        fluticasone (FLONASE) 50 MCG/ACT nasal spray 1 spray  1 spray Each Nare Daily Cyndie Fester Sedar, DO        aspirin EC tablet 81 mg  81 mg Oral Daily Cyndie Fester Sedar, DO   81 mg at 11/07/17 0759       Allergies:     Allergies   Allergen Reactions    Clonidine Derivatives     Hydrochlorothiazide     Losartan     Nitrofuran Derivatives     Pradaxa [Dabigatran Etexilate Mesylate]     Sular [Nisoldipine]     Xarelto [Rivaroxaban] Rash       Problem List:    Patient Active Problem List   Diagnosis Code    Hypertension I10    Hyperlipidemia E78.5    Gout M10.9    CAD (coronary artery disease) I25.10 kg/m².    CBC:   Lab Results   Component Value Date    WBC 10.2 11/07/2017    RBC 3.79 11/07/2017    RBC 3.79 04/30/2012    HGB 12.3 11/07/2017    HCT 36.2 11/07/2017    MCV 95.7 11/07/2017    RDW 13.1 11/07/2017     11/07/2017       CMP:   Lab Results   Component Value Date     11/07/2017    K 4.3 11/07/2017    CL 95 11/07/2017    CO2 25 11/07/2017    BUN 15 11/07/2017    CREATININE 0.71 11/07/2017    GFRAA >60.0 11/07/2017    LABGLOM >60.0 11/07/2017    GLUCOSE 93 11/07/2017    PROT 6.4 11/06/2017    CALCIUM 8.6 11/07/2017    BILITOT 0.9 11/06/2017    ALKPHOS 66 11/06/2017    AST 28 11/06/2017    ALT 32 11/06/2017       POC Tests: No results for input(s): POCGLU, POCNA, POCK, POCCL, POCBUN, POCHEMO, POCHCT in the last 72 hours. Coags:   Lab Results   Component Value Date    PROTIME 11.5 11/06/2017    INR 1.1 11/06/2017    APTT 29.8 11/06/2017       HCG (If Applicable): No results found for: PREGTESTUR, PREGSERUM, HCG, HCGQUANT     ABGs: No results found for: PHART, PO2ART, ESW7NQY, CUM3ESH, BEART, C9GZNCUJ     Type & Screen (If Applicable):  No results found for: LABABO, [address]    Anesthesia Evaluation  Patient summary reviewed and Nursing notes reviewed no history of anesthetic complications:   Airway: Mallampati: II  TM distance: >3 FB   Neck ROM: full  Mouth opening: > = 3 FB Dental: normal exam         Pulmonary:negative ROS and normal exam                               Cardiovascular:    (+) hypertension: no interval change, CAD: no interval change,       ECG reviewed                        Neuro/Psych:   {neg ROS              GI/Hepatic/Renal:             Endo/Other: negative ROS                    Abdominal:           Vascular:                                      Anesthesia Plan      general     ASA 3       Induction: intravenous. MIPS: Postoperative opioids intended and Prophylactic antiemetics administered. Anesthetic plan and risks discussed with patient.       Plan discussed with CRNA.    Attending anesthesiologist reviewed and agrees with Pre Eval content              Chelly Hollins MD   11/7/2017

## 2017-11-07 NOTE — PROGRESS NOTES
Pt assessment has been completed, pt is resting in bed with left extremity elevated. Pt denies pain. Pts dressing on left extremity is clean, dry and intact. Pt says they take zocar at night at home, medication not ordered for tonight. MD aware of this order. Call light within reach, safety precautions in place.      Electronically signed by Felix Gibson RN on 11/6/2017 at 8:33 PM

## 2017-11-07 NOTE — PROGRESS NOTES
BP increased overnight, Dr. Eveline Carver ordered PRN hydralazine.     Electronically signed by Mikey Sainz RN on 11/7/2017 at 7:26 AM

## 2017-11-07 NOTE — CONSULTS
Consults                                 Consult Dictated. Impression: traumatic hematoma lt Leg.                Recommend: Need evacuation of the hematoma lt leg

## 2017-11-07 NOTE — PROGRESS NOTES
Dr. Jimenez Boateng to do evacuation of hematoma on left lower extremity this afternoon. PT NPO now, consent signed. Family at bedside and verbalize understanding of plan of care. VSS. Safety maintained. Call light in reach.  Electronically signed by Gokul Crum RN on 11/7/2017 at 12:15 PM

## 2017-11-07 NOTE — PROGRESS NOTES
Pt return from surgery. Pt denies pain, VSS, initial post op dressing on with ace wrap. Clean, dry, intact. VSS. Family at bedside.  Electronically signed by Jillian De Santiago RN on 11/7/2017 at 6:31 PM

## 2017-11-07 NOTE — ANESTHESIA POSTPROCEDURE EVALUATION
Department of Anesthesiology  Postprocedure Note    Patient: Melonie Barcenas  MRN: [id number]  YOB: 1940  Date of evaluation: 11/7/2017  Time:  5:14 PM     Procedure Summary     Date:  11/07/17 Room / Location:  [facility] OR [address] [facility] OR    Anesthesia Start:  2836 Anesthesia Stop:      Procedure:  EVACUATION HEMATOMA LEFT LEG (Left ) Diagnosis:  (HEMATOMA)    Surgeon:  Phoenix Palacios MD Responsible Provider:  Soniya Denise MD    Anesthesia Type:  general ASA Status:  3          Anesthesia Type: general    Tiera Phase I: Tiera Score: 10    Tiera Phase II:      Last vitals: Reviewed and per EMR flowsheets.        Anesthesia Post Evaluation    Patient location during evaluation: bedside  Patient participation: complete - patient participated  Level of consciousness: awake and awake and alert  Pain score: 0  Airway patency: patent  Nausea & Vomiting: no nausea and no vomiting  Complications: no  Cardiovascular status: blood pressure returned to baseline and hemodynamically stable  Respiratory status: acceptable  Hydration status: euvolemic

## 2017-11-07 NOTE — CONSULTS
Lavinia Joshi La Krystle [address] [place] [address], [id number] [facility]                                 CONSULTATION    PATIENT NAME: Matthew Philippe                   :         1940  MED REC NO:   [id number]                            ROOM:       C574  ACCOUNT NO:   [de-identified]                           ADMIT DATE:  2017  PROVIDER:     Kenny Brenner MD    CONSULT DATE:  2017    HISTORY OF PRESENT ILLNESS:  This 81-year-old male patient seen in  consultation because of traumatic hematoma in the left lower leg. According to the patient, he bumped the leg with the corner of a table  at home about 4 days ago which developed swelling and black and blued  area which progressively has been getting worse, getting enlarged,  very painful with redness. The patient denies any fever or chills. PAST MEDICAL HISTORY:  The patient is known to have a history of  coronary artery disease, gout, hyperlipidemia, hypertension,  osteoarthritis, and also has postnasal drip. PAST SURGICAL HISTORY:  The patient had colonoscopy in , coronary  angioplasty with 2 stent placement, hernia repair as well as  vasectomy. HOME MEDICATIONS:  The patient was taking Toprol-XL, Eliquis,  Micardis, Zyrtec, fish oil, nitroglycerin, Zocor, Lasix, Klor-Con, and  aspirin. ALLERGIES:  The patient is allergic to CLONIDINE, HYDROCHLOROTHIAZIDE,  LOSARTAN, NITROFURANTOIN, PRADAXA, SULAR, and XARELTO. SOCIAL HABITS:  The patient smokes cigar occasionally as well as  drinks alcohol socially but denies any drug. PHYSICAL EXAMINATION:  GENERAL:  The patient is stable and comfortable at the present time. VITAL SIGNS:  Temperature 98.1, respirations 17, pulse of 41, and  blood pressure 152/65. HEENT:  Sclerae, no jaundice. LUNGS:  Clear. ABDOMEN:  Soft. EXTREMITIES:  The lower extremities with symmetrical hyperpigmentation  change in the skin in both lower extremities.   The left lower

## 2017-11-07 NOTE — CONSULTS
Ref Range & Units Status Collected Lab   WBC 8.3  4.8 - 10.8 K/uL Final 11/06/2017  5:39 PM [address] [facility]   RBC 3.66   4.70 - 6.10 M/uL Final 11/06/2017  5:39 PM [address] [facility]   Hemoglobin 12.0   14.0 - 18.0 g/dL Final 11/06/2017  5:39 PM [address] [facility]   Hematocrit 34.9   42.0 - 52.0 % Final 11/06/2017  5:39 PM [facility]   MCV 95.2  80.0 - 100.0 fL Final 11/06/2017  5:39 PM [address] [facility]   MCH 32.6   27.0 - 31.3 pg Final 11/06/2017  5:39 PM [address] [facility]   MCHC 34.3  33.0 - 37.0 % Final 11/06/2017  5:39 PM [address] [facility]   RDW 13.1  11.5 - 14.5 % Final 11/06/2017  5:39 PM [address] [facility]   Platelets 851   172 - 400 K/uL Final 11/06/2017  5:39 PM [address] [facility]         MICROBIOLOGY:  Type   Date  Results  Wound Culture: pending  Blood Culture: none    IMAGING:  Narrative   EXAMINATION: US DUP LOWER EXTREMITY LEFT HUGO, 11/6/2017 8:45 AM        CLINICAL INFORMATION:   LOWER EXTREMITY PAIN AND SWELLING       COMPARISON:  NONE AVAILABLE       TECHNIQUE: Gray scale, duplex doppler, with compression and augmentation. Sonographic imaging was performed by a registered sonographer and the images were submitted for interpretation.        FINDINGS:    There is normal deep venous flow and compressibility from the groin to the knee.  There is segmental visualization of patent veins in the calf. In the area of palpable concern there is a heterogeneous hypoechoic mass measuring 5.3 x 2.5 x    4.0 cm. There appears to be mixed attenuation within the lesion.             Impression   1. NEGATIVE FOR VENOUS THROMBOSIS IN THE LEFT LOWER EXTREMITY. 2. HETEROGENEOUS HYPOECHOIC LESION IN THE AREA OF PALPABLE CONCERN. THIS  LIKELY REPRESENTS HEMATOMA; ABSCESS IS ALSO INCLUDED IN THE DIFFERENTIAL. IF THE LESION DOES NOT RESOLVE RECOMMEND FURTHER IMAGING EVALUATION.              Patient's case will be discussed with staff, who will provide final recommendations. Thank you for the consult.      Fadi Woods, PGY-3  Please first page Podiatry On Call, [phone]  November 6, 2017  8:17 PM

## 2017-11-08 VITALS
DIASTOLIC BLOOD PRESSURE: 76 MMHG | OXYGEN SATURATION: 97 % | HEART RATE: 117 BPM | WEIGHT: 205.4 LBS | TEMPERATURE: 99.9 F | BODY MASS INDEX: 34.22 KG/M2 | RESPIRATION RATE: 16 BRPM | HEIGHT: 65 IN | SYSTOLIC BLOOD PRESSURE: 152 MMHG

## 2017-11-08 PROBLEM — D68.32 HEMORRHAGIC DISORDER DUE TO EXTRINSIC CIRCULATING ANTICOAGULANTS (HCC): Chronic | Status: ACTIVE | Noted: 2017-11-08

## 2017-11-08 PROBLEM — D69.59 THROMBOCYTOPENIA DUE TO BLOOD LOSS: Status: ACTIVE | Noted: 2017-11-08

## 2017-11-08 PROBLEM — D62 ANEMIA ASSOCIATED WITH ACUTE BLOOD LOSS: Status: ACTIVE | Noted: 2017-11-08

## 2017-11-08 PROBLEM — Z67.40 TYPE O BLOOD, RH POSITIVE: Chronic | Status: ACTIVE | Noted: 2017-11-08

## 2017-11-08 LAB
ABO/RH: NORMAL
ANION GAP SERPL CALCULATED.3IONS-SCNC: 14 MEQ/L (ref 7–13)
ANTIBODY SCREEN: NORMAL
BUN BLDV-MCNC: 15 MG/DL (ref 8–23)
CALCIUM SERPL-MCNC: 8.4 MG/DL (ref 8.6–10.2)
CHLORIDE BLD-SCNC: 95 MEQ/L (ref 98–107)
CO2: 24 MEQ/L (ref 22–29)
CREAT SERPL-MCNC: 0.63 MG/DL (ref 0.7–1.2)
GFR AFRICAN AMERICAN: >60
GFR NON-AFRICAN AMERICAN: >60
GLUCOSE BLD-MCNC: 144 MG/DL (ref 74–109)
GRAM STAIN RESULT: NORMAL
HCT VFR BLD CALC: 36.2 % (ref 42–52)
HEMOGLOBIN: 12 G/DL (ref 14–18)
MCH RBC QN AUTO: 31.9 PG (ref 27–31.3)
MCHC RBC AUTO-ENTMCNC: 33.2 % (ref 33–37)
MCV RBC AUTO: 96 FL (ref 80–100)
PDW BLD-RTO: 13.1 % (ref 11.5–14.5)
PLATELET # BLD: 120 K/UL (ref 130–400)
POTASSIUM SERPL-SCNC: 4.3 MEQ/L (ref 3.5–5.1)
RBC # BLD: 3.77 M/UL (ref 4.7–6.1)
SODIUM BLD-SCNC: 133 MEQ/L (ref 132–144)
WBC # BLD: 11.6 K/UL (ref 4.8–10.8)
WOUND/ABSCESS: NORMAL

## 2017-11-08 PROCEDURE — G8980 MOBILITY D/C STATUS: HCPCS

## 2017-11-08 PROCEDURE — 36415 COLL VENOUS BLD VENIPUNCTURE: CPT

## 2017-11-08 PROCEDURE — 6370000000 HC RX 637 (ALT 250 FOR IP): Performed by: INTERNAL MEDICINE

## 2017-11-08 PROCEDURE — 97161 PT EVAL LOW COMPLEX 20 MIN: CPT

## 2017-11-08 PROCEDURE — 80048 BASIC METABOLIC PNL TOTAL CA: CPT

## 2017-11-08 PROCEDURE — G8987 SELF CARE CURRENT STATUS: HCPCS

## 2017-11-08 PROCEDURE — G8988 SELF CARE GOAL STATUS: HCPCS

## 2017-11-08 PROCEDURE — 2700000000 HC OXYGEN THERAPY PER DAY

## 2017-11-08 PROCEDURE — 86901 BLOOD TYPING SEROLOGIC RH(D): CPT

## 2017-11-08 PROCEDURE — 2580000003 HC RX 258: Performed by: INTERNAL MEDICINE

## 2017-11-08 PROCEDURE — G8979 MOBILITY GOAL STATUS: HCPCS

## 2017-11-08 PROCEDURE — G8978 MOBILITY CURRENT STATUS: HCPCS

## 2017-11-08 PROCEDURE — 97165 OT EVAL LOW COMPLEX 30 MIN: CPT

## 2017-11-08 PROCEDURE — 93010 ELECTROCARDIOGRAM REPORT: CPT | Performed by: INTERNAL MEDICINE

## 2017-11-08 PROCEDURE — 86900 BLOOD TYPING SEROLOGIC ABO: CPT

## 2017-11-08 PROCEDURE — 85027 COMPLETE CBC AUTOMATED: CPT

## 2017-11-08 PROCEDURE — 86850 RBC ANTIBODY SCREEN: CPT

## 2017-11-08 RX ORDER — PSEUDOEPHEDRINE HCL 30 MG
100 TABLET ORAL 2 TIMES DAILY PRN
Qty: 60 CAPSULE | Refills: 0 | Status: SHIPPED | OUTPATIENT
Start: 2017-11-08 | End: 2019-04-15

## 2017-11-08 RX ORDER — HYDROCODONE BITARTRATE AND ACETAMINOPHEN 5; 325 MG/1; MG/1
1 TABLET ORAL EVERY 8 HOURS PRN
Qty: 15 TABLET | Refills: 0 | Status: SHIPPED | OUTPATIENT
Start: 2017-11-08 | End: 2017-11-15

## 2017-11-08 RX ORDER — METOPROLOL SUCCINATE 50 MG/1
50 TABLET, EXTENDED RELEASE ORAL DAILY
Qty: 30 TABLET | Refills: 3 | Status: SHIPPED
Start: 2017-11-08 | End: 2018-04-20

## 2017-11-08 RX ADMIN — TELMISARTAN 80 MG: 40 TABLET ORAL at 11:55

## 2017-11-08 RX ADMIN — HYDROCODONE BITARTRATE AND ACETAMINOPHEN 2 TABLET: 5; 325 TABLET ORAL at 18:08

## 2017-11-08 RX ADMIN — SODIUM CHLORIDE, PRESERVATIVE FREE 10 ML: 5 INJECTION INTRAVENOUS at 11:02

## 2017-11-08 RX ADMIN — HYDROCODONE BITARTRATE AND ACETAMINOPHEN 2 TABLET: 5; 325 TABLET ORAL at 02:35

## 2017-11-08 RX ADMIN — HYDROCODONE BITARTRATE AND ACETAMINOPHEN 2 TABLET: 5; 325 TABLET ORAL at 13:54

## 2017-11-08 RX ADMIN — ASPIRIN 81 MG: 81 TABLET, COATED ORAL at 11:00

## 2017-11-08 RX ADMIN — METOPROLOL SUCCINATE 50 MG: 50 TABLET, EXTENDED RELEASE ORAL at 11:00

## 2017-11-08 RX ADMIN — HYDROCODONE BITARTRATE AND ACETAMINOPHEN 2 TABLET: 5; 325 TABLET ORAL at 07:48

## 2017-11-08 RX ADMIN — DOCUSATE SODIUM 100 MG: 100 CAPSULE, LIQUID FILLED ORAL at 11:00

## 2017-11-08 ASSESSMENT — PAIN SCALES - GENERAL
PAINLEVEL_OUTOF10: 2
PAINLEVEL_OUTOF10: 0
PAINLEVEL_OUTOF10: 10
PAINLEVEL_OUTOF10: 7
PAINLEVEL_OUTOF10: 10
PAINLEVEL_OUTOF10: 10
PAINLEVEL_OUTOF10: 5
PAINLEVEL_OUTOF10: 0
PAINLEVEL_OUTOF10: 2

## 2017-11-08 NOTE — CARE COORDINATION
Patient seen during rounds. Complaining of some pain with ambulation but denies needs @ discharge. RN reports patient had a lot of bleeding post-procedure 11/7 but resolved with pressure, no drainage seen on dressing. Hgb pending.

## 2017-11-08 NOTE — PROGRESS NOTES
Following shift change tonight pt had some post op bleeding. Bleed through ace and Kerlix. . Dressing reinforced with Kerlix and new ace and call placed to dr Melba Torres. Pt also medicated for pain.

## 2017-11-08 NOTE — PLAN OF CARE
Problem: IP BALANCE  Goal: LTG - patient will maintain standing balance to allow for completion of daily activities  Outcome: Ongoing  Min/ Mod A LB ADLs

## 2017-11-08 NOTE — PROGRESS NOTES
of Function:  Social/Functional History  Lives With: Spouse  Type of Home: House  Home Layout: Two level, Able to Live on Main level with bedroom/bathroom  Home Access: Stairs to enter with rails  Entrance Stairs - Number of Steps: 4 with one rail  Home Equipment: Standard walker  ADL Assistance: Independent  Ambulation Assistance: Independent  Transfer Assistance: Independent  Active : Yes  Occupation: Retired    OBJECTIVE:   Vision/Hearing:  Vision: Within Functional Limits  Hearing: Within functional limits    Cognition:  Overall Orientation Status: Within Normal Limits  Follows Commands: Within Functional Limits         ROM:  LLE General PROM: mild knee flexion limitation due to hematoma evacuation 11/7    Strength:  Strength RLE  Strength RLE: WFL  Strength LLE  Strength LLE: WFL    Neuro:  Balance  Sitting - Static: Good  Sitting - Dynamic: Good  Standing - Static: Good (with BUE support)  Standing - Dynamic: Good (with BUE support)             Bed mobility  Bridging: Independent  Rolling to Left: Independent  Rolling to Right: Independent  Supine to Sit: Independent  Sit to Supine: Independent  Scooting: Independent    Transfers  Sit to Stand: Independent  Stand to sit:  Independent  Bed to Chair: Independent  Comment: weight primarily on the RLE    Ambulation  Ambulation?: Yes  Ambulation 1  Surface: level tile  Device: Rolling Walker  Assistance: Modified Independent;Supervision  Quality of Gait: decrease LLE weight  acceptance due to pain, decrease left knee flexion  Distance: 75 feet  Comments: Pt able to utilize ww as a std walker safely  Stairs/Curb  Stairs?: No (Pt reports he feels confident in performing entry stairway and requests no practice at this time)    Activity Tolerance  Activity Tolerance: Patient Tolerated treatment well          ASSESSMENT:   Decision Making: Low Complexity  History: med  Exam: low  Clinical Presentation: low    Patient Education: instruted pt in utilization of std

## 2017-11-08 NOTE — PROGRESS NOTES
Spoke to Dr. Geovanna Maritnez regarding post op bleeding from LLE. Per  dressing was removed manual pressure applied for 25 min. New dressing applied. Cont to monitor for bleeding. Pt has good cap refill and states that he has feeling in his foot. Pt given medication for pain and new dressing seems to be holding. Will monitor closely.

## 2017-11-08 NOTE — PROGRESS NOTES
[facility] OCCUPATIONAL THERAPY EVALUATION - ACUTE   Room: B014/N447-96  Date: 2017  Patient Name: Hernando Butt        MRN: [id number]  Account: [de-identified]   : 1940  (78 y.o.)    Chart Review:  Diagnosis:  The primary encounter diagnosis was Hematoma. A diagnosis of Cellulitis of left lower extremity was also pertinent to this visit. Past Medical History:   Diagnosis Date    CAD (coronary artery disease)     Gout     Hyperlipidemia     Hypertension     Osteoarthritis     Post-nasal drip      Past Surgical History:   Procedure Laterality Date    COLONOSCOPY  14    JARMOSZUK    CORONARY ANGIOPLASTY WITH STENT PLACEMENT      EXCISION / BIOPSY SKIN LESION OF LEG Left 2017    EVACUATION HEMATOMA LEFT LEG performed by Verona Tavera MD at [facility]       Precautions:  falls       Evaluation and Pt. rights have been reviewed: [x]Yes   [] No   If no why not:   Falls safety interventions in place  [x]Yes   [] No    Comments:     Subjective: \"I usually do everything ok\"    Prior living arrangement:    Support contact: wife  Pt lives: [] Alone   [x] With spouse   [] Other   Comment:    Home: [x] Single level   []  Two level   []  Split level     []  Apartment:     Entrance:  Stairs: 4 Hand rails 1,    Inside: Stairs: 0 Hand rails: 0  Bathroom: [] Bath tub   [x] Tub/Shower combo   []  Shower stall    Location: main level    DME: [] W/W   [] [address] [place] [address]   [] Rollator   []  W/C   [] Koby Mount   [] Shower Chair   [] [facility]  [] Dressing  AE  [] Other:      Previous Functional Status:   Ind with [employer] and ADLs    Pain:   Start of session: 7/10  Location: L LE  Description: throbs  Action: [] No Action Necessary    [x] Patient reports pain at acceptable level for treatment  [] Nursing notified    [] Other      Objective:  Observation:  Alert, cooperative    Orientation: Oriented to  [x] Person   [x] Place  [x]Time    Vision:   [x]  [facility]   [] Impaired  Comments:  glasses Hearing:  [x] [facility]   [] Impaired  Comments:    Sensation:   [x] WFL   []  Impaired   Comments:      Cognition:   [x] WFL   [] Impaired  Comments:    Communication:   [x] WFL   [] Impaired  Comments:    Range of Motion:  R UE AROM/PROM: [x]  WFL [] Impaired  Comments:   L UE AROM/PROM:  [x]  WFL [] Impaired  Comments:     Strength:   R UE Strength: []1    [] 2   [] 2+   [x] 3   [] 3+   [] 4   [] 4+  [] 5  Comments:   L UE Strength:  []1    [] 2   [] 2+   [x] 3   [] 3+  [] 4   [] 4+   [] 5  Comments:     Quality of Movement:  [x] Good   [] Fair   [] Poor     Coordination:  Gross motor: [x] WFL   [] Impaired   Fine motor: [x] WFL   [] Impaired     Functional Mobility:  Toilet Transfers:  Sup with [employer]  Bed Transfer:Sup supine to sit and scooting to head of bed  Sit to stand: SBA  Bed to Chair:  Pt decline and returned to bed secondary to chair being uncomfortable    Seated Balance:      Static: [x] Good  [] Fair   [] Poor   Dynamic: [x]  Good  [] Fair   [] Poor     Standing Balance:   LOB backward when bending to touch knees  Static: [x] Good   [] Fair  [] Poor   Dynamic: [] Good   [x] Fair+   [] Poor     Functional Endurance: [] Good  [x] Fair+  [] Poor     ADLs  Feeding:  Ind  UE Dressing:  CGA with gown  LB Dressing: Mod A, unable to reach L sock, LOB backward when simulating pulling up pants from knees  Bathing:  Min A simulated  Toileting: SBA  Grooming: Ind seated    Goals:   Patient will:   [x]  Improve functional endurance to tolerate/complete 30 mins of ADL's   [x]  Be Ind in UB ADLs    [x]  Be Mod I in LB ADLs   [x]  Be Mod I in ADL transfers without LOB   [x]  Be Ind in toileting tasks   [x]  Improve B hand fine motor coordination to [facility] in order to manage clothing fasteners/self-care containers in a timely manner   [x]  Improve B UE Function (AROM, strength, motor control, tone normalization) to complete ADLs as projected.    [x]  Improve B UE strength and endurance to [facility] in order to participate in

## 2017-11-08 NOTE — PROGRESS NOTES
PODIATRIC MEDICINE INPATIENT PROGRESS NOTE    Patient Name: Dashawn Rao  MRN: [id number]    FOLLOW UP REGARDING:  Traumatic hematoma of left lateral calf    ASSESSMENT:   Traumatic hematoma of left lateral calf    Plan:   Patient examined and evaluated  Previous and today's labs reviewed   Evacuation with fine needle bedside was unsuccessful in evacuating hematoma formation. Consult placed to general surgery for evaluation and treatment for left calf hematoma. Elevation of left lower extremity   Dressing change today consisted of DSD compressive dressing. Podiatry to sign off at this time. INTERVAL HPI and PERTINENT ROS: Very pleasant 78y.o. year old male seen in bed today for left calf hematoma. Patient relates that pain is decreased from yesterday. Rated today as 5 out of 10 and achy in nature. Patient admits to a good appetite. Patient denies nausea, vomiting, diarrhea, fevers, chills, chest pain, shortness of breath, head ache or calf pain. No other pedal complaints. OBJECTIVE:  BP (!) 133/58   Pulse 89   Temp 98.2 °F (36.8 °C)   Resp 16   Ht 5' 5\" (1.651 m)   Wt 205 lb 6.4 oz (93.2 kg)   SpO2 98%   BMI 34.18 kg/m²   Patient is alert and oriented x 3 in NAD. Vascular:   palpable Dorsalis Pedis and Posterior Tibial Pulses B/L  Capillary Fill time < 3 seconds to B/L digits  Skin temperature warm to warm tibial tuberosity to the digits B/L  Increase in temperature noted to left lateral calf over hematoma site. Hair growth present to digits  No edema, no varicosities      Neurological:   Epicritic sensation intact B/L  Protective sensation via monofilament testing intact B/L     Musculoskeletal/Orthopaedic:   Structural Deformities: None  5/5 muscle strength Dorsiflexion, Plantarflexion, Inversion, Eversion B/L  ROM decreased pedal and ankle joints b/l.   Pain on palpation to left lateral calf.     Dermatological:   Skin appears well hydrated and supple with good temperature, texture, turgor. Hyperkeratosis  Absent. Hematoma formation noted roughly 2 cm distal to head of fibular head. Hematoma is roughly 5 cm x 5 cm with single 0.5 cm x 0.5 cm hemorraghic blister noted centrally. No appreciable erythema or edema surrounding hematoma site. Skin lines are decreased. Nails 1-5 appear WNL and interspaces are clear and without debris.   No open lesions, ulcerations, verruca appreciated B/L.    LABORATORY:  Lab Results   Component Value Date    WBC 10.2 11/07/2017    HGB 12.3 (L) 11/07/2017    HCT 36.2 (L) 11/07/2017    MCV 95.7 11/07/2017     (L) 11/07/2017    LYMPHOPCT 20.3 11/07/2017    RBC 3.79 (L) 11/07/2017    MCH 32.6 (H) 11/07/2017    MCHC 34.1 11/07/2017    RDW 13.1 11/07/2017       Lab Results   Component Value Date     11/07/2017    K 4.3 11/07/2017    CL 95 (L) 11/07/2017    CO2 25 11/07/2017    BUN 15 11/07/2017    CREATININE 0.71 11/07/2017    GLUCOSE 93 11/07/2017    CALCIUM 8.6 11/07/2017    PROT 6.4 11/06/2017    LABALBU 3.9 11/06/2017    BILITOT 0.9 11/06/2017    ALKPHOS 66 11/06/2017    AST 28 11/06/2017    ALT 32 11/06/2017    LABGLOM >60.0 11/07/2017    GFRAA >60.0 11/07/2017    GLOB 2.5 11/06/2017       No results found for: SEDRATE  No results found for: CRP    MICROBIOLOGY:  11/7/2017 10:14 AM - Cj, Chpo Incoming Lab Results From Soft     Component Results     Component Collected Lab   WOUND/ABSCESS 11/06/2017  4:10 PM [address] [facility]   No growth 24 hours    Gram Stain Result 11/06/2017  4:10 PM [address] [facility]   Few WBC's, No organisms seen        Elmer Adamson, PGY-3  Please first page Podiatry On Call, [phone]  November 7, 2017  9:00 PM

## 2017-11-08 NOTE — DISCHARGE SUMMARY
were submitted for interpretation. FINDINGS:    There is normal deep venous flow and compressibility from the groin to the knee. There is segmental visualization of patent veins in the calf. In the area of palpable concern there is a heterogeneous hypoechoic mass measuring 5.3 x 2.5 x 4.0 cm. There appears to be mixed attenuation within the lesion. 1. NEGATIVE FOR VENOUS THROMBOSIS IN THE LEFT LOWER EXTREMITY. 2. HETEROGENEOUS HYPOECHOIC LESION IN THE AREA OF PALPABLE CONCERN. THIS  LIKELY REPRESENTS HEMATOMA; ABSCESS IS ALSO INCLUDED IN THE DIFFERENTIAL. IF THE LESION DOES NOT RESOLVE RECOMMEND FURTHER IMAGING EVALUATION. Discharge Medications:       Stephanie Chimera   [place] Medication Instructions UWD:[id number]    Printed on:11/08/17 4403   Medication Information                      apixaban (ELIQUIS) 5 MG TABS tablet               aspirin 81 MG EC tablet  Take 81 mg by mouth daily. cetirizine (ZYRTEC) 5 MG tablet  Take 5 mg by mouth daily             docusate sodium (COLACE, DULCOLAX) 100 MG CAPS  Take 100 mg by mouth 2 times daily as needed for Constipation Do not crush or break. furosemide (LASIX) 20 MG tablet  Take 20 mg by mouth See Admin Instructions. Take one tab on Monday wed., and Friday                HYDROcodone-acetaminophen (NORCO) 5-325 MG per tablet  Take 1 tablet by mouth every 8 hours as needed (Severe Pain)  Maximum dose of acetaminophen is 4000 mg from all sources in 24 hours. .             metoprolol succinate (TOPROL XL) 50 MG extended release tablet  Take 1 tablet by mouth daily Do no crush or break             Misc Natural Products (BLACK CHERRY CONCENTRATE PO)  Take by mouth             nitroGLYCERIN (NITROSTAT) 0.4 MG SL tablet  Place 0.4 mg under the tongue every 5 minutes as needed. Omega-3 Fatty Acids (FISH OIL PO)  Take by mouth             potassium chloride (KLOR-CON) 10 MEQ CR tablet  Take 10 mEq by mouth daily.  Take one tab

## 2017-11-09 NOTE — FLOWSHEET NOTE
Dr. Jimenez Boateng at bedside for dressing change. Minimal drainage noted and patient tolerated it well. Wife watched and denied any questions.

## 2017-11-10 LAB
ANAEROBIC CULTURE: NORMAL
CULTURE SURGICAL: NORMAL
GRAM STAIN RESULT: NORMAL

## 2017-11-11 LAB
BLOOD CULTURE, ROUTINE: NORMAL
CULTURE, BLOOD 2: NORMAL

## 2017-11-22 ENCOUNTER — HOSPITAL ENCOUNTER (OUTPATIENT)
Dept: WOUND CARE | Age: 77
Discharge: HOME | End: 2017-11-22
Payer: MEDICARE

## 2017-11-22 VITALS
DIASTOLIC BLOOD PRESSURE: 98 MMHG | SYSTOLIC BLOOD PRESSURE: 194 MMHG | HEART RATE: 92 BPM | RESPIRATION RATE: 18 BRPM | TEMPERATURE: 96.6 F

## 2017-11-22 DIAGNOSIS — S81.801D WOUND OF RIGHT LOWER EXTREMITY, SUBSEQUENT ENCOUNTER: ICD-10-CM

## 2017-11-22 PROBLEM — S81.801A WOUND OF RIGHT LEG: Status: ACTIVE | Noted: 2017-11-22

## 2017-11-22 PROCEDURE — 87077 CULTURE AEROBIC IDENTIFY: CPT

## 2017-11-22 PROCEDURE — 97597 DBRDMT OPN WND 1ST 20 CM/<: CPT

## 2017-11-22 PROCEDURE — 87185 SC STD ENZYME DETCJ PER NZM: CPT

## 2017-11-22 PROCEDURE — 97598 DBRDMT OPN WND ADDL 20CM/<: CPT

## 2017-11-22 PROCEDURE — 99213 OFFICE O/P EST LOW 20 MIN: CPT

## 2017-11-22 PROCEDURE — 87186 SC STD MICRODIL/AGAR DIL: CPT

## 2017-11-22 PROCEDURE — 11042 DBRDMT SUBQ TIS 1ST 20SQCM/<: CPT

## 2017-11-22 PROCEDURE — 87070 CULTURE OTHR SPECIMN AEROBIC: CPT

## 2017-11-22 PROCEDURE — 87205 SMEAR GRAM STAIN: CPT

## 2017-11-22 PROCEDURE — 87075 CULTR BACTERIA EXCEPT BLOOD: CPT

## 2017-11-22 RX ORDER — CEPHALEXIN 500 MG/1
500 CAPSULE ORAL 2 TIMES DAILY
Qty: 20 CAPSULE | Refills: 0 | Status: SHIPPED | OUTPATIENT
Start: 2017-11-22 | End: 2017-12-06

## 2017-11-22 NOTE — PROGRESS NOTES
[facility] Physician Billing Sheet. Wilberto Harrison  AGE: 78 y.o.    GENDER: male  : 1940  TODAY'S DATE:  2017    ICD-10 CODES  Active Hospital Problems    Diagnosis Date Noted    Wound of right leg [S81.801A] 2017       PHYSICIAN PROCEDURES    CPT CODE  51910  51698    Electronically signed by Nancy Spear MD on 2017 at 10:02 AM

## 2017-11-22 NOTE — PROGRESS NOTES
[facility]   Progress Note and Procedure Note      [address] RECORD NUMBER:  [id number]  AGE: 78 y.o. GENDER: male  : 1940  EPISODE DATE:  2017    Subjective:     Chief Complaint   Patient presents with    Wound Check     left leg wound        HISTORY of PRESENT ILLNESS HPI     Mayra Coleman is a 78 y.o. male who presents today  for wound/ulcer evaluation.    History of Wound Context: traumatic ulcer  Wound/Ulcer Pain Timing/Severity: none  Quality of pain: N/A  Severity:  0 / 10   Modifying Factors: None  Associated Signs/Symptoms: edema and dry necrotic skin  Pen harpal drain removed, no drainage    Ulcer Identification:  Ulcer Type: traumatic  Contributing Factors: shear force    Wound: N/A        PAST MEDICAL HISTORY        Diagnosis Date    CAD (coronary artery disease)     Gout     Hyperlipidemia     Hypertension     Osteoarthritis     Post-nasal drip        PAST SURGICAL HISTORY    Past Surgical History:   Procedure Laterality Date    COLONOSCOPY  14    JARMOSZUK    CORONARY ANGIOPLASTY WITH STENT PLACEMENT      EXCISION / BIOPSY SKIN LESION OF LEG Left 2017    EVACUATION HEMATOMA LEFT LEG performed by Jasmin Brennan MD at [facility] 2 HISTORY    Family History   Problem Relation Age of Onset    Heart Disease Father     COPD Father     Other Mother      Myasthenia Gravis    Heart Disease Sister     Other Daughter      MVA       SOCIAL HISTORY    Social History   Substance Use Topics    Smoking status: Former Smoker     Quit date: 2001    Smokeless tobacco: Never Used    Alcohol use Yes      Comment: Occasionally       ALLERGIES    Allergies   Allergen Reactions    Clonidine Derivatives     Hydrochlorothiazide     Losartan     Nitrofuran Derivatives     Pradaxa [Dabigatran Etexilate Mesylate]     Sular [Nisoldipine]     Xarelto [Rivaroxaban] Rash       MEDICATIONS    Current Outpatient Prescriptions on File Prior to Encounter   Medication Sig Dispense Refill    docusate sodium (COLACE, DULCOLAX) 100 MG CAPS Take 100 mg by mouth 2 times daily as needed for Constipation Do not crush or break. 60 capsule 0    metoprolol succinate (TOPROL XL) 50 MG extended release tablet Take 1 tablet by mouth daily Do no crush or break 30 tablet 3    apixaban (ELIQUIS) 5 MG TABS tablet       telmisartan (MICARDIS) 80 MG tablet 80 mg daily       Misc Natural Products (BLACK CHERRY CONCENTRATE PO) Take by mouth      cetirizine (ZYRTEC) 5 MG tablet Take 5 mg by mouth daily      Omega-3 Fatty Acids (FISH OIL PO) Take by mouth      nitroGLYCERIN (NITROSTAT) 0.4 MG SL tablet Place 0.4 mg under the tongue every 5 minutes as needed.  aspirin 81 MG EC tablet Take 81 mg by mouth daily.  simvastatin (ZOCOR) 40 MG tablet Take 40 mg by mouth nightly.  furosemide (LASIX) 20 MG tablet Take 20 mg by mouth See Admin Instructions. Take one tab on Monday wed., and Friday         potassium chloride (KLOR-CON) 10 MEQ CR tablet Take 10 mEq by mouth daily. Take one tab Monday wed and friday        No current facility-administered medications on file prior to encounter. REVIEW OF SYSTEMS    Pertinent items are noted in HPI. Objective:      BP (!) 194/98   Pulse 92   Temp 96.6 °F (35.9 °C) (Oral)   Resp 18     Wt Readings from Last 3 Encounters:   11/07/17 205 lb 6.4 oz (93.2 kg)   10/10/17 201 lb (91.2 kg)   04/10/17 200 lb (90.7 kg)       PHYSICAL EXAM    Patient awake, alert and oriented x3 and in no acute distress. Vascular exam: peripheral pulses symmetrical, present. +2 edema noted to left LE. Neurological exam: ache. Dermatological exam:  See below for ulcer location, description and size. Assessment:      There are no active hospital problems to display for this patient.        Procedure Note  Indications:  Based on my examination of this patient's N/A      Bleeding:  Minimal    Hemostasis Achieved:  by silver nitrate stick    Procedural Pain:  1  / 10     Post Procedural Pain:  0 / 10     Response to treatment:  Well tolerated by patient. Plan:     Treatment Note please see attached Discharge Instructions    In my professional opinion this patient would benefit from HBO Therapy: No    Written patient dismissal instructions given to patient and signed by patient or POA. Thank you for choosing [facility] and allowing us to help heal your wounds. If you should have any questions after your visit, please call [phone]. Discharge Instructions       Visit Discharge/Physician Orders:    Home Care:    Wound Location:  Left leg    Dressing orders: 1. Cleanse wound(s) with normal saline. 2. Apply dry SILVERCEL OR CALCIUM ALGINATE WITH Ag or eqivalent to wound bed. 3. Cover with 4x4's and wrap with gauze (aung or kerlix)  4. Change  Every other day or Monday, Wednesday, and Friday    Compression:  Apply med. (10-20) compression hose to both lower legs, may remove at bedtime, reapply first thing in the morning, avoid prolonged standing, elevate legs when sitting    Other Instructions:   Culture in [address] [place] [address] Road today    Keep all dressings clean & dry. Do not shower, take baths or get wound wet, unless otherwise instructed by your Wound Care doctor. Follow up visit   2 Weeks    For Diabetic patients keep blood sugars below 150 for optimal wound healing. If you experience any of the following, please call the Wound Care Service during business hours: [phone]   *Increase in pain   *Temperature over 101   *Increase in drainage from your wound or a foul odor   *Uncontrolled swelling *Need for compression bandage changes due to slippage, breakthrough drainage      If you need medical attention outside of business hours, please contact your Primary Care Doctor or go to the nearest emergency room. Keep next scheduled appointment. Please give 24 hour notice if unable to keep appointment. [phone]    Patient Signature:_______________________     Nurse Signature_________________    Date: ___________ Time:  ____________         PLEASE NOTE: IF YOU ARE UNABLE TO OBTAIN WOUND SUPPLIES, CONTINUE TO USE THE SUPPLIES YOU HAVE AVAILABLE UNTIL YOU ARE AB[facility]  IT IS MOST IMPORTANT TO KEEP THE WOUND COVERED AT ALL TIMES  Electronically signed by Kulwant Nelson MD on 11/22/2017 at 10:13 AM        Electronically signed by Kulwant Nelson MD on 11/22/2017 at 10:26 AM

## 2017-11-25 LAB
ANAEROBIC CULTURE: ABNORMAL
GRAM STAIN RESULT: ABNORMAL
ORGANISM: ABNORMAL
WOUND/ABSCESS: ABNORMAL

## 2017-12-06 ENCOUNTER — HOSPITAL ENCOUNTER (OUTPATIENT)
Dept: WOUND CARE | Age: 77
Discharge: HOME | End: 2017-12-06
Payer: MEDICARE

## 2017-12-06 VITALS
SYSTOLIC BLOOD PRESSURE: 185 MMHG | HEART RATE: 79 BPM | RESPIRATION RATE: 18 BRPM | BODY MASS INDEX: 34.16 KG/M2 | TEMPERATURE: 96.6 F | DIASTOLIC BLOOD PRESSURE: 86 MMHG | HEIGHT: 65 IN | WEIGHT: 205 LBS

## 2017-12-06 PROCEDURE — 99213 OFFICE O/P EST LOW 20 MIN: CPT

## 2017-12-06 ASSESSMENT — PAIN SCALES - GENERAL: PAINLEVEL_OUTOF10: 0

## 2017-12-06 NOTE — PROGRESS NOTES
[address] Physician Billing Sheet. Edna Gutierrez  AGE: 78 y.o.    GENDER: male  : 1940  TODAY'S DATE:  2017    ICD-10 CODES  Active Hospital Problems    Diagnosis Date Noted    Wound of right leg [S81.801A] 2017       PHYSICIAN PROCEDURES    CPT CODE  24936      Electronically signed by Claudia Rodríguez MD on 2017 at 2:42 PM

## 2017-12-06 NOTE — PROGRESS NOTES
Christine Jacinto 11/22/2017        Procedure Note  Indications:  Based on my examination of this patient's wound(s)/ulcer(s) today, debridement is not required to promote healing and evaluate the wound base. Performed by: Rocío Chen MD    C    Wound 11/22/17 Leg Anterior;Left;Lower #1 left lower anterior leg (Active)   Wound Image   12/6/2017  2:10 PM   Wound Type Wound 12/6/2017  2:10 PM   Wound Traumatic 12/6/2017  2:10 PM   Wound Cleansed Rinsed/Irrigated with saline 12/6/2017  2:10 PM   Wound Length (cm) 6.3 cm 12/6/2017  2:10 PM   Wound Width (cm) 3.3 cm 12/6/2017  2:10 PM   Wound Depth (cm)  0.3 12/6/2017  2:10 PM   Calculated Wound Size (cm^2) (l*w) 20.79 cm^2 12/6/2017  2:10 PM   Change in Wound Size % (l*w) 48.67 12/6/2017  2:10 PM   Drainage Amount Moderate 12/6/2017  2:10 PM   Drainage Description Serosanguinous; Tan 12/6/2017  2:10 PM   Odor None 12/6/2017  2:10 PM   Geno-wound Assessment Dry; Intact 12/6/2017  2:10 PM   Non-staged Wound Description Full thickness 12/6/2017  2:10 PM   Red%Wound Bed 20 12/6/2017  2:10 PM   Yellow%Wound Bed 75 12/6/2017  2:10 PM   Black%Wound Bed 5 12/6/2017  2:10 PM   Op First Treatment Date 11/22/17 11/22/2017  9:18 AM   Number of days: 14       P        Plan:     Treatment Note please see attached Discharge Instructions    In my professional opinion this patient would benefit from HBO Therapy: No    Written patient dismissal instructions given to patient and signed by patient or POA. Thank you for choosing [facility] and allowing us to help heal your wounds. If you should have any questions after your visit, please call [phone]. Discharge Instructions         Discharge Instructions        Visit Discharge/Physician Orders:     Home Care:     Wound Location:  Left leg     Dressing orders: 1. Cleanse wound(s) with normal saline. 2. Apply dry SILVERCEL OR CALCIUM ALGINATE WITH Ag or eqivalent to wound bed.   3. Cover with 4x4's and wrap with gauze (aung or kerlix)  4. Change  Every other day or Monday, Wednesday, and Friday     Compression:  Apply med. (10-20) compression hose to both lower legs, may remove at bedtime, reapply first thing in the morning, avoid prolonged standing, elevate legs when sitting     Other Instructions:        Keep all dressings clean & dry. Do not shower, take baths or get wound wet, unless otherwise instructed by your Wound Care doctor.     Follow up visit   4 Weeks  January 3, 2017 at 2:00     For Diabetic patients keep blood sugars below 150 for optimal wound healing.     If you experience any of the following, please call the Wound Care Service during business hours: [phone]         *Increase in pain   *Temperature over 101   *Increase in drainage from your wound or a foul odor   *Uncontrolled swelling      *Need for compression bandage changes due to slippage, breakthrough drainage                                                                                                                                                                                                                                                                                                                                                                                              If you need medical attention outside of business hours, please contact your Primary Care Doctor or go to the nearest emergency room. Keep next scheduled appointment. Please give 24 hour notice if unable to keep appointment. [phone]     Patient Signature:_______________________      Nurse Signature_________________     Date: ___________ Time:  ____________          PLEASE NOTE: IF YOU ARE UNABLE TO OBTAIN WOUND SUPPLIES, CONTINUE TO USE THE SUPPLIES YOU HAVE AVAILABLE UNTIL YOU ARE ABLE TO REACH US. IT IS MOST IMPORTANT TO KEEP THE WOUND COVERED AT ALL TIMES.   Electronically signed by Aubrey Nielsen MD on 12/6/2017 at 2:36 PM          Electronically signed by

## 2018-01-03 ENCOUNTER — HOSPITAL ENCOUNTER (OUTPATIENT)
Dept: WOUND CARE | Age: 78
Discharge: HOME | End: 2018-01-03
Payer: MEDICARE

## 2018-01-03 VITALS — DIASTOLIC BLOOD PRESSURE: 77 MMHG | TEMPERATURE: 96.4 F | RESPIRATION RATE: 18 BRPM | SYSTOLIC BLOOD PRESSURE: 170 MMHG

## 2018-01-03 DIAGNOSIS — S81.802D TRAUMATIC OPEN WOUND OF LEFT LOWER LEG, SUBSEQUENT ENCOUNTER: ICD-10-CM

## 2018-01-03 PROCEDURE — 11055 PARING/CUTG B9 HYPRKER LES 1: CPT

## 2018-01-03 NOTE — PROGRESS NOTES
examination of this patient's wound(s)/ulcer(s) today, debridement is not required to promote healing and evaluate the wound base. But paring of the of the dry scab done using scisors and forcept    Performed by: Lizzie Cheney MD    C    Wound 11/22/17 Leg Anterior;Left;Lower #1 left lower anterior leg (Active)   Wound Image   1/3/2018  2:15 PM   Wound Type Wound 1/3/2018  2:15 PM   Wound Traumatic 1/3/2018  2:15 PM   Wound Cleansed Rinsed/Irrigated with saline 1/3/2018  2:15 PM   Wound Length (cm) 5.5 cm 1/3/2018  2:15 PM   Wound Width (cm) 2.4 cm 1/3/2018  2:15 PM   Wound Depth (cm)  0.2 1/3/2018  2:15 PM   Calculated Wound Size (cm^2) (l*w) 13.2 cm^2 1/3/2018  2:15 PM   Change in Wound Size % (l*w) 67.41 1/3/2018  2:15 PM   Drainage Amount Moderate 1/3/2018  2:15 PM   Drainage Description Yellow;Serosanguinous 1/3/2018  2:15 PM   Odor Mild 1/3/2018  2:15 PM   Geno-wound Assessment Dry;Excoriated 1/3/2018  2:15 PM   Non-staged Wound Description Full thickness 1/3/2018  2:15 PM   Red%Wound Bed 30 1/3/2018  2:15 PM   Yellow%Wound Bed 70 1/3/2018  2:15 PM   Black%Wound Bed 5 12/6/2017  2:10 PM   Op First Treatment Date 11/22/17 11/22/2017  9:18 AM   Number of days: 42       Wound 01/03/18 Other (Comment) Leg Left;Lateral #2 (Active)   Wound Image   1/3/2018  2:15 PM   Wound Type Wound 1/3/2018  2:15 PM   Wound Other 1/3/2018  2:15 PM   Wound Cleansed Rinsed/Irrigated with saline 1/3/2018  2:15 PM   Wound Length (cm) 0.6 cm 1/3/2018  2:15 PM   Wound Width (cm) 0.9 cm 1/3/2018  2:15 PM   Wound Depth (cm)  0.1 1/3/2018  2:15 PM   Calculated Wound Size (cm^2) (l*w) 0.54 cm^2 1/3/2018  2:15 PM   Drainage Amount Small 1/3/2018  2:15 PM   Drainage Description Serosanguinous 1/3/2018  2:15 PM   Odor None 1/3/2018  2:15 PM   Geno-wound Assessment Clean;Dry; Intact 1/3/2018  2:15 PM   Non-staged Wound Description Partial thickness 1/3/2018  2:15 PM   Impact%Wound Bed 100 1/3/2018  2:15 PM   Op First Treatment Date 01/03/18 1/3/2018  2:15 PM   Number of days: 0       P    Plan:     Treatment Note please see attached Discharge Instructions    In my professional opinion this patient would benefit from HBO Therapy: No    Written patient dismissal instructions given to patient and signed by patient or POA. Thank you for choosing [facility] and allowing us to help heal your wounds. If you should have any questions after your visit, please call [phone]. Discharge Instructions         Discharge Instructions        Visit Discharge/Physician Orders:     Home Care:     Wound Location:  Left leg     Dressing orders:  1.Cleanse wound(s) with normal saline. 2. Apply dry SILVERCEL OR CALCIUM ALGINATE WITH Ag or eqivalent to wound bed. 3. Cover with 4x4's and wrap with gauze (aung or kerlix)  4. Change  Every other day or Monday, Wednesday, and Friday     Compression:  Apply med. (10-20) compression hose to both lower legs, may remove at bedtime, reapply first thing in the morning, avoid prolonged standing, elevate legs when sitting     Other Instructions:        Keep all dressings clean & dry. Do not shower, take baths or get wound wet, unless otherwise instructed by your Wound Care doctor.     Follow up visit   4 Weeks  January 31 , 2017 at      For Diabetic patients keep blood sugars below 150 for optimal wound healing.     If you experience any of the following, please call the Wound Care Service during business hours: [phone]         *Increase in pain   *Temperature over 101   *Increase in drainage from your wound or a foul odor   *Uncontrolled swelling      *Need for compression bandage changes due to slippage, breakthrough drainage     If you need medical attention outside of business hours, please contact your Primary Care Doctor or go to the nearest emergency room. Keep next scheduled appointment. Trent Shahid give 24 hour notice if unable to keep appointment.  [phone]     Patient

## 2018-01-04 DIAGNOSIS — D22.9 ATYPICAL MOLE: Primary | ICD-10-CM

## 2018-01-17 PROBLEM — S81.802A TRAUMATIC OPEN WOUND OF LEFT LOWER LEG: Status: ACTIVE | Noted: 2018-01-17

## 2018-01-17 PROBLEM — S81.801A WOUND OF RIGHT LEG: Status: RESOLVED | Noted: 2017-11-22 | Resolved: 2018-01-17

## 2018-01-31 ENCOUNTER — HOSPITAL ENCOUNTER (OUTPATIENT)
Dept: WOUND CARE | Age: 78
Discharge: HOME | End: 2018-01-31
Payer: MEDICARE

## 2018-01-31 VITALS — HEART RATE: 73 BPM | SYSTOLIC BLOOD PRESSURE: 163 MMHG | DIASTOLIC BLOOD PRESSURE: 76 MMHG | RESPIRATION RATE: 18 BRPM

## 2018-01-31 PROCEDURE — 99213 OFFICE O/P EST LOW 20 MIN: CPT

## 2018-01-31 NOTE — PLAN OF CARE
Problem: Wound:  Goal: Will show signs of wound healing; wound closure and no evidence of infection  Will show signs of wound healing; wound closure and no evidence of infection   Outcome: Ongoing  See flowsheet

## 2018-01-31 NOTE — PROGRESS NOTES
Wound Image   1/31/2018 11:30 AM   Wound Type Wound 1/31/2018 11:30 AM   Wound Traumatic 1/31/2018 11:30 AM   Wound Cleansed Rinsed/Irrigated with saline 1/31/2018 11:30 AM   Wound Length (cm) 4.1 cm 1/31/2018 11:30 AM   Wound Width (cm) 0.7 cm 1/31/2018 11:30 AM   Wound Depth (cm)  0.15 1/31/2018 11:30 AM   Calculated Wound Size (cm^2) (l*w) 2.87 cm^2 1/31/2018 11:30 AM   Change in Wound Size % (l*w) 92.91 1/31/2018 11:30 AM   Undermining Starts ___ O'Clock 12 1/31/2018 11:30 AM   Undermining Ends___ O'Clock 3 1/31/2018 11:30 AM   Undermining Maxium Distance (cm) 0.2 1/31/2018 11:30 AM   Drainage Amount None 1/31/2018 11:30 AM   Drainage Description Yellow;Serosanguinous 1/3/2018  2:15 PM   Odor None 1/31/2018 11:30 AM   Geno-wound Assessment Clean;Dry; Intact 1/31/2018 11:30 AM   Non-staged Wound Description Full thickness 1/31/2018 11:30 AM   Red%Wound Bed 80 1/31/2018 11:30 AM   Yellow%Wound Bed 20 1/31/2018 11:30 AM   Black%Wound Bed 5 12/6/2017  2:10 PM   Op First Treatment Date 11/22/17 11/22/2017  9:18 AM   Number of days: 70       Wound 01/03/18 Other (Comment) Leg Left;Lateral #2 (Active)   Wound Image   1/31/2018 11:30 AM   Wound Type Wound 1/31/2018 11:30 AM   Wound Other 1/31/2018 11:30 AM   Wound Cleansed Rinsed/Irrigated with saline 1/31/2018 11:30 AM   Wound Length (cm) 0 cm 1/31/2018 11:30 AM   Wound Width (cm) 0 cm 1/31/2018 11:30 AM   Wound Depth (cm)  0 1/31/2018 11:30 AM   Calculated Wound Size (cm^2) (l*w) 0 cm^2 1/31/2018 11:30 AM   Change in Wound Size % (l*w) 100 1/31/2018 11:30 AM   Drainage Amount Small 1/3/2018  2:15 PM   Drainage Description Serosanguinous 1/3/2018  2:15 PM   Odor None 1/3/2018  2:15 PM   Geno-wound Assessment Clean;Dry; Intact 1/3/2018  2:15 PM   Non-staged Wound Description Partial thickness 1/3/2018  2:15 PM   [place]ley%Wound Bed 100 1/3/2018  2:15 PM   Op First Treatment Date 01/03/18 1/3/2018  2:15 PM   Number of days: 27       P    Plan:     Treatment Note please see CONTINUE TO USE THE SUPPLIES YOU HAVE AVAILABLE UNTIL YOU ARE ABLE TO REACH US.  IT IS MOST IMPORTANT TO KEEP THE WOUND COVERED AT ALL TIMES  Electronically signed by Janny Manuel MD on 1/31/2018 at 11:55 AM        Electronically signed by Janny Manuel MD on 1/31/2018 at 11:56 AM

## 2018-03-07 ENCOUNTER — HOSPITAL ENCOUNTER (OUTPATIENT)
Dept: WOUND CARE | Age: 78
Discharge: HOME | End: 2018-03-07
Payer: MEDICARE

## 2018-03-08 ENCOUNTER — HOSPITAL ENCOUNTER (EMERGENCY)
Age: 78
Discharge: HOME | End: 2018-03-08
Attending: EMERGENCY MEDICINE
Payer: MEDICARE

## 2018-03-08 VITALS
BODY MASS INDEX: 34.16 KG/M2 | RESPIRATION RATE: 18 BRPM | OXYGEN SATURATION: 97 % | WEIGHT: 205 LBS | SYSTOLIC BLOOD PRESSURE: 176 MMHG | DIASTOLIC BLOOD PRESSURE: 111 MMHG | HEIGHT: 65 IN | HEART RATE: 95 BPM | TEMPERATURE: 97.9 F

## 2018-03-08 DIAGNOSIS — T14.8XXA BLEEDING FROM WOUND: Primary | ICD-10-CM

## 2018-03-08 DIAGNOSIS — D68.69 HYPERCOAGULABLE STATE, SECONDARY (HCC): ICD-10-CM

## 2018-03-08 PROCEDURE — 6370000000 HC RX 637 (ALT 250 FOR IP): Performed by: EMERGENCY MEDICINE

## 2018-03-08 PROCEDURE — 99282 EMERGENCY DEPT VISIT SF MDM: CPT

## 2018-03-08 RX ORDER — GINSENG 100 MG
CAPSULE ORAL ONCE
Status: COMPLETED | OUTPATIENT
Start: 2018-03-08 | End: 2018-03-08

## 2018-03-08 RX ADMIN — BACITRACIN: 500 OINTMENT TOPICAL at 02:15

## 2018-03-08 NOTE — ED PROVIDER NOTES
[facility] [facility]  eMERGENCY dEPARTMENT eNCOUnter      Pt Name: Leanna Lea  MRN: [id number]  [place] 1940  Date of evaluation: 3/8/2018  Provider: Hina Smith MD    [address] [place] [address]       Chief Complaint   Patient presents with    Post-op Problem     had facial surgery Monday, started back on eliquis yesterday, started bleeding this morning         HISTORY OF PRESENT ILLNESS   (Location/Symptom, Timing/Onset, Context/Setting, Quality, Duration, Modifying Factors, Severity)  Note limiting factors. Leanna Lea is a 78 y.o. 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 The initial debulking was done Thursday of last week, plastics repair,/flap, was done Monday. Some postoperative bleeding was seen, puffiness was seen in the wound and blood was L by the surgeon earlier today. It still oozing and is here for that. He is on Eliquis because of atrial fibrillation and is [place] in atrial fibrillation. Eliquis had been held for a few days following the surgery and before the surgery. He is not a smoker. hedoesn't drink heavily. HPI    Nursing Notes were reviewed. REVIEW OF SYSTEMS    (2-9 systems for level 4, 10 or more for level 5)     Review of Systems  Constitutional symptoms:  no Fatigue, no fever, no chills. Skin symptoms:  Negative except as documented in HPI. Bleeding from the face as above  ENMT symptoms:  Negative except as documented in HPI. Respiratory symptoms:  Negative except as documented in HPI. Cardiovascular symptoms:  Negative except as documented in HPI. Gastrointestinal symptoms: Negative except for documented as above in the HPI   Genitourinary symptoms:  Negative except as documented in HPI. Musculoskeletal symptoms:  Negative except as documented in HPI. Neurologic symptoms:  Negative except as documented in HPI.    Remainder of 10 systems, all negative except wound.  This is non-dangerous bleeding of 1 drop every 30 seconds and this was timed it is very consistent    NEURO: -Patient: alert                -Oriented to: person, place and time. -Appearance and judgment: appropriate.                -Cranial Nerves: Normal.                -Speech: Normal          DIAGNOSTIC RESULTS     EKG: All EKG's are interpreted by the Emergency Department Physician who either signs or Co-signs this chart in the absence of a cardiologist.        RADIOLOGY:   Non-plain film images such as CT, Ultrasound and MRI are read by the radiologist. Plain radiographic images are visualized and preliminarily interpreted by the emergency physician with the below findings:        Interpretation per the Radiologist below, if available at the time of this note:    No orders to display         ED BEDSIDE ULTRASOUND:   Performed by ED Physician - none    LABS:  Labs Reviewed - No data to display    All other labs were within normal range or not returned as of this dictation. EMERGENCY DEPARTMENT COURSE and DIFFERENTIAL DIAGNOSIS/MDM:   Vitals:    Vitals:    03/08/18 0133   BP: (!) 176/111   Pulse: 95   Resp: 18   Temp: 97.9 °F (36.6 °C)   TempSrc: Oral   SpO2: 97%   Weight: 205 lb (93 kg)   Height: 5' 5\" (1.651 m)           MDM     Patient is already on Keflex, heart rate is irregular and I believe that we need to hold the Eliquis there is some risk in this, he will see his plastic surgeon on Friday. For further bleeding which is more profuse he will return. He was told to keep the area cleansed, also, he is on Keflex and will stay on this. Spreading redness or discharge is recommended. There is bruising over the zygoma on the right, which I believe postoperative bleeding, there is no necrosis of skin at this juncture    CRITICAL CARE TIME   Total Critical Care time was  minutes, excluding separately reportable procedures.   There was a high probability of clinically significant/life

## 2018-03-08 NOTE — ED TRIAGE NOTES
Patient had facial surgery on Thursday to [facility] [place] some cancer\", and then had facial reconstruction surgery on Monday at an outpatient surgery center in [facility], they had started him back on his eliquis yesterday, the incision started bleeding Tuesday morning, they saw the surgeon, the surgeon removed some blood and fluid because there was a lot of swelling, patient states he hasn't been able to get the bleeding to stop ever since. He denies any pain at present time but said if anyone presses on the area it will be very painful.

## 2018-03-21 ENCOUNTER — HOSPITAL ENCOUNTER (OUTPATIENT)
Dept: WOUND CARE | Age: 78
Discharge: HOME | End: 2018-03-21
Payer: MEDICARE

## 2018-03-21 VITALS
DIASTOLIC BLOOD PRESSURE: 79 MMHG | SYSTOLIC BLOOD PRESSURE: 177 MMHG | RESPIRATION RATE: 18 BRPM | TEMPERATURE: 96.4 F | HEART RATE: 86 BPM

## 2018-03-21 PROCEDURE — 99212 OFFICE O/P EST SF 10 MIN: CPT

## 2018-04-20 ENCOUNTER — OFFICE VISIT (OUTPATIENT)
Dept: INTERNAL MEDICINE CLINIC | Age: 78
End: 2018-04-20
Payer: MEDICARE

## 2018-04-20 VITALS
WEIGHT: 204 LBS | RESPIRATION RATE: 14 BRPM | BODY MASS INDEX: 33.99 KG/M2 | HEART RATE: 98 BPM | OXYGEN SATURATION: 98 % | SYSTOLIC BLOOD PRESSURE: 126 MMHG | DIASTOLIC BLOOD PRESSURE: 84 MMHG | HEIGHT: 65 IN | TEMPERATURE: 97.8 F

## 2018-04-20 DIAGNOSIS — I25.10 CORONARY ARTERY DISEASE INVOLVING NATIVE HEART WITHOUT ANGINA PECTORIS, UNSPECIFIED VESSEL OR LESION TYPE: ICD-10-CM

## 2018-04-20 DIAGNOSIS — I10 ESSENTIAL HYPERTENSION: Primary | ICD-10-CM

## 2018-04-20 DIAGNOSIS — D68.32 HEMORRHAGIC DISORDER DUE TO EXTRINSIC CIRCULATING ANTICOAGULANTS (HCC): ICD-10-CM

## 2018-04-20 DIAGNOSIS — I48.20 CHRONIC ATRIAL FIBRILLATION (HCC): ICD-10-CM

## 2018-04-20 PROCEDURE — 3288F FALL RISK ASSESSMENT DOCD: CPT | Performed by: FAMILY MEDICINE

## 2018-04-20 PROCEDURE — 99213 OFFICE O/P EST LOW 20 MIN: CPT | Performed by: FAMILY MEDICINE

## 2018-04-20 PROCEDURE — 4040F PNEUMOC VAC/ADMIN/RCVD: CPT | Performed by: FAMILY MEDICINE

## 2018-04-20 PROCEDURE — G8427 DOCREV CUR MEDS BY ELIG CLIN: HCPCS | Performed by: FAMILY MEDICINE

## 2018-04-20 PROCEDURE — 1036F TOBACCO NON-USER: CPT | Performed by: FAMILY MEDICINE

## 2018-04-20 PROCEDURE — G8598 ASA/ANTIPLAT THER USED: HCPCS | Performed by: FAMILY MEDICINE

## 2018-04-20 PROCEDURE — 1123F ACP DISCUSS/DSCN MKR DOCD: CPT | Performed by: FAMILY MEDICINE

## 2018-04-20 PROCEDURE — G8417 CALC BMI ABV UP PARAM F/U: HCPCS | Performed by: FAMILY MEDICINE

## 2018-04-20 PROCEDURE — G8510 SCR DEP NEG, NO PLAN REQD: HCPCS | Performed by: FAMILY MEDICINE

## 2018-04-20 RX ORDER — METOPROLOL SUCCINATE 100 MG/1
100 TABLET, EXTENDED RELEASE ORAL DAILY
COMMUNITY
Start: 2018-03-05

## 2018-04-20 ASSESSMENT — ENCOUNTER SYMPTOMS
RESPIRATORY NEGATIVE: 1
GASTROINTESTINAL NEGATIVE: 1
EYES NEGATIVE: 1
SHORTNESS OF BREATH: 0
ALLERGIC/IMMUNOLOGIC NEGATIVE: 1

## 2018-04-20 ASSESSMENT — PATIENT HEALTH QUESTIONNAIRE - PHQ9
1. LITTLE INTEREST OR PLEASURE IN DOING THINGS: 0
SUM OF ALL RESPONSES TO PHQ9 QUESTIONS 1 & 2: 0
SUM OF ALL RESPONSES TO PHQ QUESTIONS 1-9: 0
2. FEELING DOWN, DEPRESSED OR HOPELESS: 0

## 2018-10-17 ENCOUNTER — OFFICE VISIT (OUTPATIENT)
Dept: INTERNAL MEDICINE CLINIC | Age: 78
End: 2018-10-17
Payer: MEDICARE

## 2018-10-17 VITALS
BODY MASS INDEX: 33.89 KG/M2 | HEIGHT: 65 IN | DIASTOLIC BLOOD PRESSURE: 80 MMHG | OXYGEN SATURATION: 96 % | WEIGHT: 203.4 LBS | RESPIRATION RATE: 20 BRPM | HEART RATE: 79 BPM | SYSTOLIC BLOOD PRESSURE: 136 MMHG

## 2018-10-17 DIAGNOSIS — I10 ESSENTIAL HYPERTENSION: Primary | ICD-10-CM

## 2018-10-17 DIAGNOSIS — Z23 NEED FOR INFLUENZA VACCINATION: ICD-10-CM

## 2018-10-17 DIAGNOSIS — E78.2 MIXED HYPERLIPIDEMIA: ICD-10-CM

## 2018-10-17 PROCEDURE — 90662 IIV NO PRSV INCREASED AG IM: CPT | Performed by: FAMILY MEDICINE

## 2018-10-17 PROCEDURE — G8482 FLU IMMUNIZE ORDER/ADMIN: HCPCS | Performed by: FAMILY MEDICINE

## 2018-10-17 PROCEDURE — 99214 OFFICE O/P EST MOD 30 MIN: CPT | Performed by: FAMILY MEDICINE

## 2018-10-17 PROCEDURE — 4040F PNEUMOC VAC/ADMIN/RCVD: CPT | Performed by: FAMILY MEDICINE

## 2018-10-17 PROCEDURE — G8417 CALC BMI ABV UP PARAM F/U: HCPCS | Performed by: FAMILY MEDICINE

## 2018-10-17 PROCEDURE — 1101F PT FALLS ASSESS-DOCD LE1/YR: CPT | Performed by: FAMILY MEDICINE

## 2018-10-17 PROCEDURE — G8598 ASA/ANTIPLAT THER USED: HCPCS | Performed by: FAMILY MEDICINE

## 2018-10-17 PROCEDURE — 1036F TOBACCO NON-USER: CPT | Performed by: FAMILY MEDICINE

## 2018-10-17 PROCEDURE — G8427 DOCREV CUR MEDS BY ELIG CLIN: HCPCS | Performed by: FAMILY MEDICINE

## 2018-10-17 PROCEDURE — G0008 ADMIN INFLUENZA VIRUS VAC: HCPCS | Performed by: FAMILY MEDICINE

## 2018-10-17 PROCEDURE — 1123F ACP DISCUSS/DSCN MKR DOCD: CPT | Performed by: FAMILY MEDICINE

## 2018-10-17 ASSESSMENT — ENCOUNTER SYMPTOMS
ALLERGIC/IMMUNOLOGIC NEGATIVE: 1
SHORTNESS OF BREATH: 0
GASTROINTESTINAL NEGATIVE: 1
RESPIRATORY NEGATIVE: 1
EYES NEGATIVE: 1

## 2018-10-17 NOTE — PROGRESS NOTES
Vaccine Information Sheet, \"Influenza - Inactivated\"  given to Jaspal Gonzalez, or parent/legal guardian of  Jaspal Gonzalez and verbalized understanding. Patient responses:    Have you ever had a reaction to a flu vaccine? No  Are you able to eat eggs without adverse effects? Yes  Do you have any current illness? No  Have you ever had Guillian [place] Syndrome? No    Flu vaccine given per order. Please see immunization tab.
succinate (TOPROL XL) 100 MG extended release tablet Take 100 mg by mouth daily       docusate sodium (COLACE, DULCOLAX) 100 MG CAPS Take 100 mg by mouth 2 times daily as needed for Constipation Do not crush or break. 60 capsule 0    apixaban (ELIQUIS) 5 MG TABS tablet Take 5 mg by mouth 2 times daily       telmisartan (MICARDIS) 80 MG tablet 80 mg daily       Misc Natural Products (BLACK CHERRY CONCENTRATE PO) Take by mouth      cetirizine (ZYRTEC) 5 MG tablet Take 5 mg by mouth daily      nitroGLYCERIN (NITROSTAT) 0.4 MG SL tablet Place 0.4 mg under the tongue every 5 minutes as needed.  aspirin 81 MG EC tablet Take 81 mg by mouth daily.  simvastatin (ZOCOR) 40 MG tablet Take 40 mg by mouth nightly.  furosemide (LASIX) 20 MG tablet Take 20 mg by mouth See Admin Instructions. Take one tab on Monday wed., and Friday         potassium chloride (KLOR-CON) 10 MEQ CR tablet Take 10 mEq by mouth daily. Take one tab Monday wed and friday        No current facility-administered medications on file prior to visit. Allergies   Allergen Reactions    Clonidine Derivatives     Hydrochlorothiazide     Losartan     Nitrofuran Derivatives     Pradaxa [Dabigatran Etexilate Mesylate]     Sular [Nisoldipine]     Xarelto [Rivaroxaban] Rash     Health Maintenance   Topic Date Due    Flu vaccine (1) 09/01/2018    Shingles Vaccine (1 of 2 - 2 Dose Series) 10/20/2018 (Originally 4/13/1992)    Potassium monitoring  10/09/2019    Creatinine monitoring  10/09/2019    DTaP/Tdap/Td vaccine (2 - Td) 01/01/2023    Pneumococcal low/med risk  Completed       Review of Systems    Review of Systems   Constitutional: Negative for activity change, appetite change, chills, fever and unexpected weight change. HENT: Negative. Eyes: Negative. Respiratory: Negative. Negative for shortness of breath. Cardiovascular: Negative. Negative for chest pain and palpitations. Gastrointestinal: Negative.

## 2019-04-09 LAB
ALBUMIN: 3.8 G/DL (ref 3.4–5)
ALP BLD-CCNC: 71 U/L (ref 33–136)
ALT SERPL-CCNC: 27 U/L (ref 10–52)
ANION GAP SERPL CALCULATED.3IONS-SCNC: 11 MMOL/L (ref 10–20)
AST SERPL-CCNC: 28 U/L (ref 9–39)
BICARBONATE: 29 MMOL/L (ref 21–32)
BILIRUB SERPL-MCNC: 0.9 MG/DL (ref 0–1.2)
CALCIUM SERPL-MCNC: 8.9 MG/DL (ref 8.6–10.3)
CHLORIDE BLD-SCNC: 100 MMOL/L (ref 98–107)
CHOLESTEROL/HDL RATIO: 2.4
CHOLESTEROL: 118 MG/DL (ref 0–199)
CREAT SERPL-MCNC: 0.81 MG/DL (ref 0.5–1.3)
GFR AFRICAN AMERICAN: >60 ML/MIN/1.73M2
GFR NON-AFRICAN AMERICAN: >60 ML/MIN/1.73M2
GLUCOSE: 100 MG/DL (ref 74–99)
HDLC SERPL-MCNC: 49 MG/DL
LDL CHOLESTEROL: 54 MG/DL (ref 0–99)
POTASSIUM SERPL-SCNC: 4 MMOL/L (ref 3.5–5.3)
SODIUM BLD-SCNC: 136 MMOL/L (ref 136–145)
TOTAL PROTEIN: 6.7 G/DL (ref 6.4–8.2)
TRIGL SERPL-MCNC: 76 MG/DL (ref 0–149)
UREA NITROGEN: 13 MG/DL (ref 6–23)
VLDLC SERPL CALC-MCNC: 15 MG/DL (ref 0–40)

## 2019-04-15 ENCOUNTER — OFFICE VISIT (OUTPATIENT)
Dept: INTERNAL MEDICINE CLINIC | Age: 79
End: 2019-04-15
Payer: MEDICARE

## 2019-04-15 VITALS
HEIGHT: 65 IN | BODY MASS INDEX: 32.89 KG/M2 | DIASTOLIC BLOOD PRESSURE: 78 MMHG | RESPIRATION RATE: 12 BRPM | TEMPERATURE: 98.3 F | SYSTOLIC BLOOD PRESSURE: 128 MMHG | WEIGHT: 197.4 LBS | HEART RATE: 77 BPM | OXYGEN SATURATION: 97 %

## 2019-04-15 DIAGNOSIS — I10 ESSENTIAL HYPERTENSION: Primary | ICD-10-CM

## 2019-04-15 DIAGNOSIS — D68.32 HEMORRHAGIC DISORDER DUE TO EXTRINSIC CIRCULATING ANTICOAGULANTS (HCC): ICD-10-CM

## 2019-04-15 DIAGNOSIS — I48.20 CHRONIC ATRIAL FIBRILLATION (HCC): ICD-10-CM

## 2019-04-15 PROCEDURE — 1123F ACP DISCUSS/DSCN MKR DOCD: CPT | Performed by: FAMILY MEDICINE

## 2019-04-15 PROCEDURE — G8427 DOCREV CUR MEDS BY ELIG CLIN: HCPCS | Performed by: FAMILY MEDICINE

## 2019-04-15 PROCEDURE — 1036F TOBACCO NON-USER: CPT | Performed by: FAMILY MEDICINE

## 2019-04-15 PROCEDURE — 99214 OFFICE O/P EST MOD 30 MIN: CPT | Performed by: FAMILY MEDICINE

## 2019-04-15 PROCEDURE — G8417 CALC BMI ABV UP PARAM F/U: HCPCS | Performed by: FAMILY MEDICINE

## 2019-04-15 PROCEDURE — 4040F PNEUMOC VAC/ADMIN/RCVD: CPT | Performed by: FAMILY MEDICINE

## 2019-04-15 PROCEDURE — G8510 SCR DEP NEG, NO PLAN REQD: HCPCS | Performed by: FAMILY MEDICINE

## 2019-04-15 PROCEDURE — G8598 ASA/ANTIPLAT THER USED: HCPCS | Performed by: FAMILY MEDICINE

## 2019-04-15 ASSESSMENT — PATIENT HEALTH QUESTIONNAIRE - PHQ9
SUM OF ALL RESPONSES TO PHQ QUESTIONS 1-9: 0
2. FEELING DOWN, DEPRESSED OR HOPELESS: 0
SUM OF ALL RESPONSES TO PHQ QUESTIONS 1-9: 0
SUM OF ALL RESPONSES TO PHQ9 QUESTIONS 1 & 2: 0
DEPRESSION UNABLE TO ASSESS: FUNCTIONAL CAPACITY MOTIVATION LIMITS ACCURACY
1. LITTLE INTEREST OR PLEASURE IN DOING THINGS: 0

## 2019-04-15 ASSESSMENT — ENCOUNTER SYMPTOMS
RESPIRATORY NEGATIVE: 1
EYES NEGATIVE: 1
SHORTNESS OF BREATH: 0
ALLERGIC/IMMUNOLOGIC NEGATIVE: 1
GASTROINTESTINAL NEGATIVE: 1

## 2019-04-15 NOTE — PROGRESS NOTES
Patient is seen in follow up for   Chief Complaint   Patient presents with    Hypertension     Pt presents here for a 6 month follow up. Pt did not want to answer the tobacco.      Hypertension   This is a chronic problem. The current episode started more than 1 year ago. The problem is unchanged. The problem is controlled. Pertinent negatives include no chest pain, palpitations or shortness of breath. There are no associated agents to hypertension. Risk factors for coronary artery disease include male gender. Past treatments include angiotensin blockers and diuretics. The current treatment provides significant improvement.        Past Medical History:   Diagnosis Date    CAD (coronary artery disease)     Gout     Hyperlipidemia     Hypertension     Osteoarthritis     Post-nasal drip      Patient Active Problem List    Diagnosis Date Noted    Traumatic open wound of left lower leg 01/17/2018    Anemia associated with acute blood loss 11/08/2017    Hemorrhagic disorder due to extrinsic circulating anticoagulants ([address] 11/08/2017    Thrombocytopenia due to blood loss 11/08/2017    Type O blood, Rh positive 11/08/2017    Hematoma of leg, left, subsequent encounter 11/07/2017    LONG TERM ANTICOAGULENT USE 11/07/2017    Chronic atrial fibrillation ([address] 10/10/2016    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    Pseudophakia 04/25/2016    Inflammation of eyelid 04/25/2016    Pinguecula 04/25/2016    Open angle with borderline findings, high risk 10/21/2015    Nonexudative age-related macular degeneration 05/28/2015    Hypertension     Hyperlipidemia     Gout     CAD (coronary artery disease)     Osteoarthritis     Vitamin D deficiency 05/09/2012    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     Past Surgical History:   Procedure Laterality Date    COLONOSCOPY  5/28/14    J[facility]ZUK    CORONARY ANGIOPLASTY WITH STENT PLACEMENT      EXCISION / BIOPSY SKIN LESION OF LEG Left 11/7/2017    EVACUATION HEMATOMA LEFT LEG performed [facility] Roxann Brown [facility] at [facility]       Family History   Problem Relation Age of Onset    Heart Disease Father     COPD Father     Other Mother         Myasthenia Gravis    Heart Disease Sister     Other Daughter         MVA     Social History     Socioeconomic History    Marital status:      Spouse name: None    Number of children: None    Years of education: None    Highest education level: None   Occupational History    None   Social Needs    Financial resource strain: None    Food insecurity:     Worry: None     Inability: None    Transportation needs:     Medical: None     Non-medical: None   Tobacco Use    Smoking status: Former Smoker     Years: 50.00     Types: Cigars, Pipe     Last attempt to quit: 2001     Years since quittin.2    Smokeless tobacco: Never Used   Substance and Sexual Activity    Alcohol use: Yes     Comment: Occasionally    Drug use: No    Sexual activity: None   Lifestyle    Physical activity:     Days per week: None     Minutes per session: None    Stress: None   Relationships    Social connections:     Talks on phone: None     Gets together: None     Attends Congregation service: None     Active member of club or organization: None     Attends meetings of clubs or organizations: None     Relationship status: None    Intimate partner violence:     Fear of current or ex partner: None     Emotionally abused: None     Physically abused: None     Forced sexual activity: None   Other Topics Concern    None   Social History Narrative    None     Current Outpatient Medications   Medication Sig Dispense Refill    metoprolol succinate (TOPROL XL) 100 MG extended release tablet Take 100 mg by mouth daily       apixaban (ELIQUIS) 5 MG TABS tablet Take 5 mg by mouth 2 times daily       telmisartan (MICARDIS) 80 MG tablet 80 mg daily       Misc Natural Products (BLACK CHERRY CONCENTRATE PO) Take by mouth      04/09/2020    Creatinine monitoring  04/09/2020    DTaP/Tdap/Td vaccine (3 - Td) 01/01/2023    Flu vaccine  Completed    Pneumococcal 65+ years Vaccine  Completed       Review of Systems     Review of Systems   Constitutional: Negative for activity change, appetite change, chills, fever and unexpected weight change. HENT: Negative. Eyes: Negative. Respiratory: Negative. Negative for shortness of breath. Cardiovascular: Negative. Negative for chest pain and palpitations. Gastrointestinal: Negative. Endocrine: Negative. Genitourinary: Negative. Musculoskeletal: Negative. Skin: Negative. Allergic/Immunologic: Negative. Neurological: Negative. Hematological: Negative. Psychiatric/Behavioral: Negative. Physical Exam  Vitals:    04/15/19 1024   BP: 128/78   Site: Right Upper Arm   Position: Sitting   Cuff Size: Large Adult   Pulse: 77   Resp: 12   Temp: 98.3 °F (36.8 °C)   TempSrc: Oral   SpO2: 97%   Weight: 197 lb 6.4 oz (89.5 kg)   Height: 5' 5\" (1.651 m)       Physical Exam   Constitutional: He is oriented to person, place, and time. He appears well-developed and well-nourished. HENT:   Right Ear: External ear normal.   Left Ear: External ear normal.   Eyes: Pupils are equal, round, and reactive to light. Conjunctivae and EOM are normal.   Neck: Normal range of motion. Neck supple. No thyromegaly present. Cardiovascular: Normal rate, regular rhythm, normal heart sounds and intact distal pulses. Exam reveals no gallop and no friction rub. No murmur heard. Pulmonary/Chest: Effort normal and breath sounds normal. No respiratory distress. He has no wheezes. Abdominal: Soft. Bowel sounds are normal. He exhibits no distension and no mass. There is no tenderness. There is no rebound and no guarding. No hernia. Genitourinary: Penis normal.   Musculoskeletal: Normal range of motion. He exhibits no edema or tenderness.    Lymphadenopathy:     He has no cervical adenopathy. Neurological: He is alert and oriented to person, place, and time. No cranial nerve deficit. Coordination normal.   Skin: Skin is warm and dry. Psychiatric: He has a normal mood and affect. Assessment   Diagnosis Orders   1. Essential hypertension     2. Hemorrhagic disorder due to extrinsic circulating anticoagulants ([facility] [address]     3. Chronic atrial fibrillation (HCC)       Problem List     Hemorrhagic disorder due to extrinsic circulating anticoagulants (HCC) (Chronic)    Hypertension - Primary    Relevant Medications    nitroGLYCERIN (NITROSTAT) 0.4 MG SL tablet    aspirin 81 MG EC tablet    simvastatin (ZOCOR) 40 MG tablet    furosemide (LASIX) 20 MG tablet    apixaban (ELIQUIS) 5 MG TABS tablet    telmisartan (MICARDIS) 80 MG tablet    metoprolol succinate (TOPROL XL) 100 MG extended release tablet    Chronic atrial fibrillation (HCC)    Relevant Medications    nitroGLYCERIN (NITROSTAT) 0.4 MG SL tablet    aspirin 81 MG EC tablet    simvastatin (ZOCOR) 40 MG tablet    furosemide (LASIX) 20 MG tablet    apixaban (ELIQUIS) 5 MG TABS tablet    telmisartan (MICARDIS) 80 MG tablet    metoprolol succinate (TOPROL XL) 100 MG extended release tablet          Plan  Follow up in 6 months. No orders of the defined types were placed in this encounter. No follow-ups on file.   Esther Huber MD

## 2019-06-03 ENCOUNTER — OFFICE VISIT (OUTPATIENT)
Dept: FAMILY MEDICINE CLINIC | Age: 79
End: 2019-06-03
Payer: MEDICARE

## 2019-06-03 VITALS
DIASTOLIC BLOOD PRESSURE: 62 MMHG | HEART RATE: 79 BPM | OXYGEN SATURATION: 99 % | BODY MASS INDEX: 31.46 KG/M2 | SYSTOLIC BLOOD PRESSURE: 132 MMHG | TEMPERATURE: 97.1 F | HEIGHT: 65 IN | WEIGHT: 188.8 LBS | RESPIRATION RATE: 14 BRPM

## 2019-06-03 DIAGNOSIS — M79.671 RIGHT FOOT PAIN: Primary | ICD-10-CM

## 2019-06-03 PROCEDURE — G8598 ASA/ANTIPLAT THER USED: HCPCS | Performed by: FAMILY MEDICINE

## 2019-06-03 PROCEDURE — G8417 CALC BMI ABV UP PARAM F/U: HCPCS | Performed by: FAMILY MEDICINE

## 2019-06-03 PROCEDURE — 4040F PNEUMOC VAC/ADMIN/RCVD: CPT | Performed by: FAMILY MEDICINE

## 2019-06-03 PROCEDURE — 99213 OFFICE O/P EST LOW 20 MIN: CPT | Performed by: FAMILY MEDICINE

## 2019-06-03 PROCEDURE — 1123F ACP DISCUSS/DSCN MKR DOCD: CPT | Performed by: FAMILY MEDICINE

## 2019-06-03 PROCEDURE — G8427 DOCREV CUR MEDS BY ELIG CLIN: HCPCS | Performed by: FAMILY MEDICINE

## 2019-06-03 PROCEDURE — 1036F TOBACCO NON-USER: CPT | Performed by: FAMILY MEDICINE

## 2019-06-03 SDOH — HEALTH STABILITY: MENTAL HEALTH: HOW OFTEN DO YOU HAVE A DRINK CONTAINING ALCOHOL?: MONTHLY OR LESS

## 2019-06-03 SDOH — HEALTH STABILITY: MENTAL HEALTH: HOW MANY STANDARD DRINKS CONTAINING ALCOHOL DO YOU HAVE ON A TYPICAL DAY?: 1 OR 2

## 2019-06-03 ASSESSMENT — ENCOUNTER SYMPTOMS
GASTROINTESTINAL NEGATIVE: 1
ALLERGIC/IMMUNOLOGIC NEGATIVE: 1
EYES NEGATIVE: 1
RESPIRATORY NEGATIVE: 1
SHORTNESS OF BREATH: 0

## 2019-06-03 NOTE — PROGRESS NOTES
Patient is seen in follow up for   Chief Complaint   Patient presents with    Foot Pain     right x 2 weeks     Health Maintenance     refused      [facility] with right foot pain shooting into the bottom stated two weeks.     Past Medical History:   Diagnosis Date    CAD (coronary artery disease)     Gout     Hyperlipidemia     Hypertension     Osteoarthritis     Post-nasal drip      Patient Active Problem List    Diagnosis Date Noted    Traumatic open wound of left lower leg 01/17/2018    Anemia associated with acute blood loss 11/08/2017    Hemorrhagic disorder due to extrinsic circulating anticoagulants ([facility] [address] 11/08/2017    Thrombocytopenia due to blood loss 11/08/2017    Type O blood, Rh positive 11/08/2017    Hematoma of leg, left, subsequent encounter 11/07/2017    LONG TERM ANTICOAGULENT USE 11/07/2017    Chronic atrial fibrillation ([facility] 10/10/2016    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    Pseudophakia 04/25/2016    Inflammation of eyelid 04/25/2016    Pinguecula 04/25/2016    Open angle with borderline findings, high risk 10/21/2015    Nonexudative age-related macular degeneration 05/28/2015    Hypertension     Hyperlipidemia     Gout     CAD (coronary artery disease)     Osteoarthritis     Vitamin D deficiency 05/09/2012    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     Past Surgical History:   Procedure Laterality Date    COLONOSCOPY  5/28/14    [facility]UK    CORONARY ANGIOPLASTY WITH STENT PLACEMENT      EXCISION / BIOPSY SKIN LESION OF LEG Left 11/7/2017    EVACUATION HEMATOMA LEFT LEG performed by Carlita Choi MD at [facility]       Family History   Problem Relation Age of Onset    Heart Disease Father     COPD Father     Other Mother         Myasthenia Gravis    Heart Disease Sister     Other Daughter         MVA     Social History     Socioeconomic History    Marital status:      Spouse name: None    Number of children: None    Years of education: None    Highest education level: None   Occupational History    None   Social Needs    Financial resource strain: None    Food insecurity:     Worry: None     Inability: None    Transportation needs:     Medical: None     Non-medical: None   Tobacco Use    Smoking status: Former Smoker     Packs/day: 0.00     Years: 50.00     Pack years: 0.00     Types: Cigars, Pipe     Last attempt to quit: 2001     Years since quittin.4    Smokeless tobacco: Never Used   Substance and Sexual Activity    Alcohol use: Yes     Alcohol/week: 0.6 oz     Types: 1 Cans of beer per week     Frequency: Monthly or less     Drinks per session: 1 or 2     Comment: Occasionally    Drug use: No    Sexual activity: None   Lifestyle    Physical activity:     Days per week: None     Minutes per session: None    Stress: None   Relationships    Social connections:     Talks on phone: None     Gets together: None     Attends Anglican service: None     Active member of club or organization: None     Attends meetings of clubs or organizations: None     Relationship status: None    Intimate partner violence:     Fear of current or ex partner: None     Emotionally abused: None     Physically abused: None     Forced sexual activity: None   Other Topics Concern    None   Social History Narrative    None     Current Outpatient Medications   Medication Sig Dispense Refill    metoprolol succinate (TOPROL XL) 100 MG extended release tablet Take 100 mg by mouth daily       apixaban (ELIQUIS) 5 MG TABS tablet Take 5 mg by mouth 2 times daily       telmisartan (MICARDIS) 80 MG tablet 80 mg daily       Misc Natural Products (BLACK CHERRY CONCENTRATE PO) Take by mouth      nitroGLYCERIN (NITROSTAT) 0.4 MG SL tablet Place 0.4 mg under the tongue every 5 minutes as needed.  aspirin 81 MG EC tablet Take 81 mg by mouth daily.  simvastatin (ZOCOR) 40 MG tablet Take 40 mg by mouth nightly.         furosemide (LASIX) 20 MG tablet Take 20 mg by mouth See Admin Instructions. Take one tab on Monday wed., and Friday         potassium chloride (KLOR-CON) 10 MEQ CR tablet Take 10 mEq by mouth daily. Take one tab Monday wed and friday       cetirizine (ZYRTEC) 5 MG tablet Take 5 mg by mouth daily       No current facility-administered medications for this visit. Current Outpatient Medications on File Prior to Visit   Medication Sig Dispense Refill    metoprolol succinate (TOPROL XL) 100 MG extended release tablet Take 100 mg by mouth daily       apixaban (ELIQUIS) 5 MG TABS tablet Take 5 mg by mouth 2 times daily       telmisartan (MICARDIS) 80 MG tablet 80 mg daily       Misc Natural Products (BLACK CHERRY CONCENTRATE PO) Take by mouth      nitroGLYCERIN (NITROSTAT) 0.4 MG SL tablet Place 0.4 mg under the tongue every 5 minutes as needed.  aspirin 81 MG EC tablet Take 81 mg by mouth daily.  simvastatin (ZOCOR) 40 MG tablet Take 40 mg by mouth nightly.  furosemide (LASIX) 20 MG tablet Take 20 mg by mouth See Admin Instructions. Take one tab on Monday wed., and Friday         potassium chloride (KLOR-CON) 10 MEQ CR tablet Take 10 mEq by mouth daily. Take one tab Monday wed and friday       cetirizine (ZYRTEC) 5 MG tablet Take 5 mg by mouth daily       No current facility-administered medications on file prior to visit.       Allergies   Allergen Reactions    Clonidine Derivatives     Hydrochlorothiazide     Losartan     Nitrofuran Derivatives     Pradaxa [Dabigatran Etexilate Mesylate]     Sular [Nisoldipine]     Xarelto [Rivaroxaban] Rash     Health Maintenance   Topic Date Due    Shingles Vaccine (1 of 2) 04/13/1992    Potassium monitoring  04/09/2020    Creatinine monitoring  04/09/2020    DTaP/Tdap/Td vaccine (3 - Td) 01/01/2023    Flu vaccine  Completed    Pneumococcal 65+ years Vaccine  Completed       Review of Systems     Review of Systems   Constitutional: Negative for activity change, appetite change, chills, fever and unexpected weight change. HENT: Negative. Eyes: Negative. Respiratory: Negative. Negative for shortness of breath. Cardiovascular: Negative. Negative for chest pain and palpitations. Gastrointestinal: Negative. Endocrine: Negative. Genitourinary: Negative. Musculoskeletal: Negative. Skin: Negative. Allergic/Immunologic: Negative. Neurological: Negative. Hematological: Negative. Psychiatric/Behavioral: Negative. Physical Exam  Vitals:    06/03/19 1424   BP: 132/62   Site: Left Upper Arm   Position: Sitting   Cuff Size: Medium Adult   Pulse: 79   Resp: 14   Temp: 97.1 °F (36.2 °C)   TempSrc: Temporal   SpO2: 99%   Weight: 188 lb 12.8 oz (85.6 kg)   Height: 5' 5\" (1.651 m)       Physical Exam   Constitutional: He is oriented to person, place, and time. He appears well-developed and well-nourished. HENT:   Right Ear: External ear normal.   Left Ear: External ear normal.   Eyes: Pupils are equal, round, and reactive to light. Conjunctivae and EOM are normal.   Neck: Normal range of motion. Neck supple. No thyromegaly present. Cardiovascular: Normal rate, regular rhythm, normal heart sounds and intact distal pulses. Exam reveals no gallop and no friction rub. No murmur heard. Pulmonary/Chest: Effort normal and breath sounds normal. No respiratory distress. He has no wheezes. Abdominal: Soft. Bowel sounds are normal. He exhibits no distension and no mass. There is no tenderness. There is no rebound and no guarding. No hernia. Genitourinary: Penis normal.   Musculoskeletal: Normal range of motion. He exhibits no edema or tenderness. Lymphadenopathy:     He has no cervical adenopathy. Neurological: He is alert and oriented to person, place, and time. No cranial nerve deficit. Coordination normal.   Skin: Skin is warm and dry. Psychiatric: He has a normal mood and affect.    right foot not currently tender exam normal    Assessment   Diagnosis Orders   1. Right foot pain       Problem List     None          Plan  Elevate and tylenol  No orders of the defined types were placed in this encounter. No follow-ups on file.   Andrew Arroyo MD

## 2019-09-24 LAB
ANION GAP SERPL CALCULATED.3IONS-SCNC: 11 MMOL/L (ref 10–20)
BICARBONATE: 29 MMOL/L (ref 21–32)
CALCIUM SERPL-MCNC: 8.7 MG/DL (ref 8.6–10.3)
CHLORIDE BLD-SCNC: 99 MMOL/L (ref 98–107)
CREAT SERPL-MCNC: 0.85 MG/DL (ref 0.5–1.3)
ERYTHROCYTE [DISTWIDTH] IN BLOOD BY AUTOMATED COUNT: 12.3 % (ref 11.5–14)
GFR AFRICAN AMERICAN: >60 ML/MIN/1.73M2
GFR NON-AFRICAN AMERICAN: >60 ML/MIN/1.73M2
GLUCOSE: 95 MG/DL (ref 74–99)
HCT VFR BLD CALC: 41.2 % (ref 41–52)
HEMOGLOBIN: 13.7 G/DL (ref 13.5–17)
MCHC RBC AUTO-ENTMCNC: 33.3 G/DL (ref 32–36)
MCV RBC AUTO: 96 FL (ref 80–100)
PLATELET # BLD: 132 X10E9/L (ref 150–450)
POTASSIUM SERPL-SCNC: 4 MMOL/L (ref 3.5–5.3)
RBC # BLD: 4.31 X10E12/L (ref 4.5–5.9)
SODIUM BLD-SCNC: 135 MMOL/L (ref 136–145)
UREA NITROGEN: 12 MG/DL (ref 6–23)
WBC: 7.9 X10E9/L (ref 4.4–11.3)

## 2019-10-14 ENCOUNTER — OFFICE VISIT (OUTPATIENT)
Dept: FAMILY MEDICINE CLINIC | Age: 79
End: 2019-10-14
Payer: MEDICARE

## 2019-10-14 VITALS
BODY MASS INDEX: 31.65 KG/M2 | OXYGEN SATURATION: 98 % | RESPIRATION RATE: 16 BRPM | DIASTOLIC BLOOD PRESSURE: 66 MMHG | HEART RATE: 65 BPM | TEMPERATURE: 97.8 F | HEIGHT: 65 IN | SYSTOLIC BLOOD PRESSURE: 134 MMHG | WEIGHT: 190 LBS

## 2019-10-14 DIAGNOSIS — Z23 NEED FOR INFLUENZA VACCINATION: ICD-10-CM

## 2019-10-14 DIAGNOSIS — E78.2 MIXED HYPERLIPIDEMIA: ICD-10-CM

## 2019-10-14 DIAGNOSIS — I10 ESSENTIAL HYPERTENSION: Primary | ICD-10-CM

## 2019-10-14 PROCEDURE — G0008 ADMIN INFLUENZA VIRUS VAC: HCPCS | Performed by: FAMILY MEDICINE

## 2019-10-14 PROCEDURE — 1123F ACP DISCUSS/DSCN MKR DOCD: CPT | Performed by: FAMILY MEDICINE

## 2019-10-14 PROCEDURE — G8598 ASA/ANTIPLAT THER USED: HCPCS | Performed by: FAMILY MEDICINE

## 2019-10-14 PROCEDURE — G8482 FLU IMMUNIZE ORDER/ADMIN: HCPCS | Performed by: FAMILY MEDICINE

## 2019-10-14 PROCEDURE — G8417 CALC BMI ABV UP PARAM F/U: HCPCS | Performed by: FAMILY MEDICINE

## 2019-10-14 PROCEDURE — 4040F PNEUMOC VAC/ADMIN/RCVD: CPT | Performed by: FAMILY MEDICINE

## 2019-10-14 PROCEDURE — 90653 IIV ADJUVANT VACCINE IM: CPT | Performed by: FAMILY MEDICINE

## 2019-10-14 PROCEDURE — G8427 DOCREV CUR MEDS BY ELIG CLIN: HCPCS | Performed by: FAMILY MEDICINE

## 2019-10-14 PROCEDURE — 99214 OFFICE O/P EST MOD 30 MIN: CPT | Performed by: FAMILY MEDICINE

## 2019-10-14 PROCEDURE — 1036F TOBACCO NON-USER: CPT | Performed by: FAMILY MEDICINE

## 2019-10-14 ASSESSMENT — ENCOUNTER SYMPTOMS
GASTROINTESTINAL NEGATIVE: 1
SHORTNESS OF BREATH: 0
EYES NEGATIVE: 1
RESPIRATORY NEGATIVE: 1
ALLERGIC/IMMUNOLOGIC NEGATIVE: 1

## 2020-10-28 ENCOUNTER — TELEPHONE (OUTPATIENT)
Dept: FAMILY MEDICINE CLINIC | Age: 80
End: 2020-10-28

## 2021-02-12 ENCOUNTER — TELEPHONE (OUTPATIENT)
Dept: FAMILY MEDICINE CLINIC | Age: 81
End: 2021-02-12

## 2021-06-28 LAB
ALBUMIN SERPL-MCNC: 4.2 G/DL (ref 3.5–4.6)
ALP BLD-CCNC: 98 U/L (ref 35–104)
ALT SERPL-CCNC: 32 U/L (ref 0–41)
ANION GAP SERPL CALCULATED.3IONS-SCNC: 10 MEQ/L (ref 9–15)
AST SERPL-CCNC: 27 U/L (ref 0–40)
BILIRUB SERPL-MCNC: 0.8 MG/DL (ref 0.2–0.7)
BUN BLDV-MCNC: 14 MG/DL (ref 8–23)
CALCIUM SERPL-MCNC: 9.4 MG/DL (ref 8.5–9.9)
CHLORIDE BLD-SCNC: 101 MEQ/L (ref 95–107)
CHOLESTEROL, TOTAL: 104 MG/DL (ref 0–199)
CO2: 27 MEQ/L (ref 20–31)
CREAT SERPL-MCNC: 0.79 MG/DL (ref 0.7–1.2)
GFR AFRICAN AMERICAN: >60
GFR NON-AFRICAN AMERICAN: >60
GLOBULIN: 3.1 G/DL (ref 2.3–3.5)
GLUCOSE BLD-MCNC: 93 MG/DL (ref 70–99)
HCT VFR BLD CALC: 40.5 % (ref 42–52)
HDLC SERPL-MCNC: 50 MG/DL (ref 40–59)
HEMOGLOBIN: 13.8 G/DL (ref 14–18)
LDL CHOLESTEROL CALCULATED: 37 MG/DL (ref 0–129)
MCH RBC QN AUTO: 32.5 PG (ref 27–31.3)
MCHC RBC AUTO-ENTMCNC: 34.1 % (ref 33–37)
MCV RBC AUTO: 95.4 FL (ref 80–100)
PDW BLD-RTO: 13.1 % (ref 11.5–14.5)
PLATELET # BLD: 145 K/UL (ref 130–400)
POTASSIUM SERPL-SCNC: 4.1 MEQ/L (ref 3.4–4.9)
RBC # BLD: 4.25 M/UL (ref 4.7–6.1)
SODIUM BLD-SCNC: 138 MEQ/L (ref 135–144)
TOTAL PROTEIN: 7.3 G/DL (ref 6.3–8)
TRIGL SERPL-MCNC: 87 MG/DL (ref 0–150)
WBC # BLD: 8.7 K/UL (ref 4.8–10.8)

## 2022-01-19 LAB
ALBUMIN SERPL-MCNC: 4 G/DL (ref 3.5–4.6)
ALP BLD-CCNC: 118 U/L (ref 35–104)
ALT SERPL-CCNC: 35 U/L (ref 0–41)
ANION GAP SERPL CALCULATED.3IONS-SCNC: 14 MEQ/L (ref 9–15)
AST SERPL-CCNC: 28 U/L (ref 0–40)
BILIRUB SERPL-MCNC: 1.2 MG/DL (ref 0.2–0.7)
BUN BLDV-MCNC: 16 MG/DL (ref 8–23)
CALCIUM SERPL-MCNC: 9 MG/DL (ref 8.5–9.9)
CHLORIDE BLD-SCNC: 103 MEQ/L (ref 95–107)
CHOLESTEROL, TOTAL: 99 MG/DL (ref 0–199)
CO2: 24 MEQ/L (ref 20–31)
CREAT SERPL-MCNC: 0.63 MG/DL (ref 0.7–1.2)
GFR AFRICAN AMERICAN: >60
GFR NON-AFRICAN AMERICAN: >60
GLOBULIN: 3.5 G/DL (ref 2.3–3.5)
GLUCOSE BLD-MCNC: 89 MG/DL (ref 70–99)
HDLC SERPL-MCNC: 42 MG/DL (ref 40–59)
LDL CHOLESTEROL CALCULATED: 40 MG/DL (ref 0–129)
POTASSIUM SERPL-SCNC: 3.9 MEQ/L (ref 3.4–4.9)
SODIUM BLD-SCNC: 141 MEQ/L (ref 135–144)
TOTAL PROTEIN: 7.5 G/DL (ref 6.3–8)
TRIGL SERPL-MCNC: 87 MG/DL (ref 0–150)

## 2022-05-05 ENCOUNTER — OFFICE VISIT (OUTPATIENT)
Dept: FAMILY MEDICINE CLINIC | Age: 82
End: 2022-05-05
Payer: MEDICARE

## 2022-05-05 VITALS
DIASTOLIC BLOOD PRESSURE: 62 MMHG | BODY MASS INDEX: 31.99 KG/M2 | HEART RATE: 76 BPM | HEIGHT: 65 IN | SYSTOLIC BLOOD PRESSURE: 126 MMHG | WEIGHT: 192 LBS | OXYGEN SATURATION: 100 % | TEMPERATURE: 98.3 F

## 2022-05-05 DIAGNOSIS — H53.461 RIGHT HOMONYMOUS HEMIANOPSIA: Primary | ICD-10-CM

## 2022-05-05 DIAGNOSIS — E78.5 HYPERLIPIDEMIA, UNSPECIFIED HYPERLIPIDEMIA TYPE: ICD-10-CM

## 2022-05-05 PROCEDURE — G8427 DOCREV CUR MEDS BY ELIG CLIN: HCPCS | Performed by: INTERNAL MEDICINE

## 2022-05-05 PROCEDURE — 1123F ACP DISCUSS/DSCN MKR DOCD: CPT | Performed by: INTERNAL MEDICINE

## 2022-05-05 PROCEDURE — G8417 CALC BMI ABV UP PARAM F/U: HCPCS | Performed by: INTERNAL MEDICINE

## 2022-05-05 PROCEDURE — 99214 OFFICE O/P EST MOD 30 MIN: CPT | Performed by: INTERNAL MEDICINE

## 2022-05-05 PROCEDURE — 4040F PNEUMOC VAC/ADMIN/RCVD: CPT | Performed by: INTERNAL MEDICINE

## 2022-05-05 PROCEDURE — 1036F TOBACCO NON-USER: CPT | Performed by: INTERNAL MEDICINE

## 2022-05-05 RX ORDER — AMLODIPINE BESYLATE 5 MG/1
TABLET ORAL
COMMUNITY

## 2022-05-05 SDOH — ECONOMIC STABILITY: FOOD INSECURITY: WITHIN THE PAST 12 MONTHS, THE FOOD YOU BOUGHT JUST DIDN'T LAST AND YOU DIDN'T HAVE MONEY TO GET MORE.: NEVER TRUE

## 2022-05-05 SDOH — ECONOMIC STABILITY: FOOD INSECURITY: WITHIN THE PAST 12 MONTHS, YOU WORRIED THAT YOUR FOOD WOULD RUN OUT BEFORE YOU GOT MONEY TO BUY MORE.: NEVER TRUE

## 2022-05-05 ASSESSMENT — ENCOUNTER SYMPTOMS
SHORTNESS OF BREATH: 0
ABDOMINAL PAIN: 0
BACK PAIN: 0
EYE PAIN: 0

## 2022-05-05 ASSESSMENT — PATIENT HEALTH QUESTIONNAIRE - PHQ9
1. LITTLE INTEREST OR PLEASURE IN DOING THINGS: 0
SUM OF ALL RESPONSES TO PHQ QUESTIONS 1-9: 0
SUM OF ALL RESPONSES TO PHQ QUESTIONS 1-9: 0
SUM OF ALL RESPONSES TO PHQ9 QUESTIONS 1 & 2: 0
2. FEELING DOWN, DEPRESSED OR HOPELESS: 0
SUM OF ALL RESPONSES TO PHQ QUESTIONS 1-9: 0
SUM OF ALL RESPONSES TO PHQ QUESTIONS 1-9: 0

## 2022-05-05 ASSESSMENT — VISUAL ACUITY
OS_CC: 20/30
OD_CC: 20/40

## 2022-05-05 ASSESSMENT — SOCIAL DETERMINANTS OF HEALTH (SDOH): HOW HARD IS IT FOR YOU TO PAY FOR THE VERY BASICS LIKE FOOD, HOUSING, MEDICAL CARE, AND HEATING?: NOT HARD AT ALL

## 2022-05-05 NOTE — PROGRESS NOTES
Subjective:      Patient ID: Sarah Gamboa is a [de-identified] y.o. male who presents today with:  Chief Complaint   Patient presents with    Annual Exam     pt is here today for routine f/u       HPI     Here for follow up   More acute  Very gradual vision loss  Diagnosed with an incongrous right homonymous hemaianopsia    Past Medical History:   Diagnosis Date    CAD (coronary artery disease)     Gout     Hyperlipidemia     Hypertension     Osteoarthritis     Post-nasal drip      Past Surgical History:   Procedure Laterality Date    COLONOSCOPY  14    [facility]UK    CORONARY ANGIOPLASTY WITH STENT PLACEMENT      EXCISION / BIOPSY SKIN LESION OF LEG Left 2017    EVACUATION HEMATOMA LEFT LEG performed by Remington Stoddard MD at [facility]       Social History     Socioeconomic History    Marital status:      Spouse name: Not on file    Number of children: Not on file    Years of education: Not on file    Highest education level: Not on file   Occupational History    Not on file   Tobacco Use    Smoking status: Former Smoker     Packs/day: 0.00     Years: 50.00     Pack years: 0.00     Types: Cigars, Pipe     Quit date: 2001     Years since quittin.3    Smokeless tobacco: Never Used   Substance and Sexual Activity    Alcohol use: Yes     Alcohol/week: 1.0 standard drink     Types: 1 Cans of beer per week     Comment: Occasionally    Drug use: No    Sexual activity: Not on file   Other Topics Concern    Not on file   Social History Narrative    Not on file     Social Determinants of Health     Financial Resource Strain: Low Risk     Difficulty of Paying Living Expenses: Not hard at all   Food Insecurity: No Food Insecurity    Worried About [employer] [employer] in the Last Year: Never true    [facility] in the Last Year: Never true   Transportation Needs:     Lack of Transportation (Medical):  Not on file    Lack of Transportation (Non-Medical): Not on file   Physical Activity:     Days of Exercise per Week: Not on file    Minutes of Exercise per Session: Not on file   Stress:     Feeling of Stress : Not on file   Social Connections:     Frequency of Communication with Friends and Family: Not on file    Frequency of Social Gatherings with Friends and Family: Not on file    Attends Denominational Services: Not on file    Active Member of [address] [place] [address] or Organizations: Not on file    Attends Club or Organization Meetings: Not on file    Marital Status: Not on file   Intimate Partner Violence:     Fear of Current or Ex-Partner: Not on file    Emotionally Abused: Not on file    Physically Abused: Not on file    Sexually Abused: Not on file   Housing Stability:     Unable to Pay for Housing in the Last Year: Not on file    Number of [place] in the Last Year: Not on file    Unstable Housing in the Last Year: Not on file     Allergies   Allergen Reactions    Clonidine Derivatives     Hydrochlorothiazide     Losartan     Nitrofuran Derivatives     Pradaxa [Dabigatran Etexilate Mesylate]     Sular [Nisoldipine]     Xarelto [Rivaroxaban] Rash     Current Outpatient Medications on File Prior to Visit   Medication Sig Dispense Refill    amLODIPine (NORVASC) 5 MG tablet Take by mouth      metoprolol succinate (TOPROL XL) 100 MG extended release tablet Take 100 mg by mouth daily       apixaban (ELIQUIS) 5 MG TABS tablet Take 5 mg by mouth 2 times daily       telmisartan (MICARDIS) 80 MG tablet 80 mg daily       Misc Natural Products (BLACK CHERRY CONCENTRATE PO) Take by mouth      nitroGLYCERIN (NITROSTAT) 0.4 MG SL tablet Place 0.4 mg under the tongue every 5 minutes as needed.  aspirin 81 MG EC tablet Take 81 mg by mouth daily.  simvastatin (ZOCOR) 40 MG tablet Take 40 mg by mouth nightly.  furosemide (LASIX) 20 MG tablet Take 20 mg by mouth See Admin Instructions.  Take one tab on Monday wed., and Friday         potassium chloride (KLOR-CON) 10 MEQ CR tablet Take 10 mEq by mouth daily. Take one tab Monday wed and friday        No current facility-administered medications on file prior to visit. I have personally reviewed the ROS, PMH, PFH, and social history     Review of Systems   Constitutional: Negative for chills. HENT: Negative for congestion. Eyes: Negative for pain. Respiratory: Negative for shortness of breath. Cardiovascular: Negative for chest pain. Gastrointestinal: Negative for abdominal pain. Genitourinary: Negative for hematuria. Musculoskeletal: Negative for back pain. Allergic/Immunologic: Negative for immunocompromised state. Neurological: Negative for headaches. Psychiatric/Behavioral: Negative for hallucinations. Objective:   /62 (Site: Left Upper Arm, Position: Sitting, Cuff Size: Medium Adult)   Pulse 76   Temp 98.3 °F (36.8 °C) (Temporal)   Ht 5' 5\" (1.651 m)   Wt 192 lb (87.1 kg)   SpO2 100%   BMI 31.95 kg/m²     Physical Exam  Constitutional:       General: He is not in acute distress. Appearance: Normal appearance. He is not ill-appearing, toxic-appearing or diaphoretic. HENT:      Head: Normocephalic. Neck:      Vascular: No carotid bruit. Cardiovascular:      Rate and Rhythm: Normal rate and regular rhythm. Pulses: Normal pulses. Heart sounds: Normal heart sounds. No murmur heard. No friction rub. No gallop. Pulmonary:      Effort: Pulmonary effort is normal. No respiratory distress. Breath sounds: Normal breath sounds. No wheezing, rhonchi or rales. Abdominal:      General: Abdomen is flat. There is no distension. Palpations: Abdomen is soft. Tenderness: There is no abdominal tenderness. There is no right CVA tenderness, left CVA tenderness, guarding or rebound. Musculoskeletal:      Cervical back: Neck supple. Right lower leg: No edema. Left lower leg: No edema. Skin:     General: Skin is warm. Findings: No erythema or rash. Neurological:      Mental Status: He is alert and oriented to person, place, and time. Cranial Nerves: No cranial nerve deficit. Psychiatric:         Mood and Affect: Mood normal.           Assessment:       Diagnosis Orders   1. Right homonymous hemianopsia  MRI Ric Choi MD, Neurology, Brandy Brunner, MD, Neurology, [place]    Bilirubin Total Direct & Indirect    Alkaline Phosphatase, Isoenzymes    CBC with Auto Differential    TSH with Reflex    US CAROTID ARTERY BILATERAL    39058 - [place] VISUAL SCREENING TEST, BILAT   2. Hyperlipidemia, unspecified hyperlipidemia type  MRI Ric Choi MD, Neurology, Brandy Brunner, MD, Neurology, [place]    Bilirubin Total Direct & Indirect    Alkaline Phosphatase, Isoenzymes    CBC with Auto Differential    TSH with Reflex    39022 - [place] VISUAL SCREENING TEST, BILAT         Plan:     vc  Fu opto as directed  Need to get ddm covid dates, patient says they're wrong. See order s            Orders Placed This Encounter   Procedures    MRI BRAIN W WO CONTRAST     Standing Status:   Future     Standing Expiration Date:   5/5/2023     Order Specific Question:   STAT Creatinine as needed:     Answer:   No     Order Specific Question:   Reason for exam:     Answer:   homonymous hemaianopsia     Order Specific Question:   What is the sedation requirement?      Answer:   None    US CAROTID ARTERY BILATERAL     Standing Status:   Future     Standing Expiration Date:   5/5/2023    Bilirubin Total Direct & Indirect     Standing Status:   Future     Standing Expiration Date:   5/5/2023    Alkaline Phosphatase, Isoenzymes     Standing Status:   Future     Standing Expiration Date:   5/5/2023    CBC with Auto Differential     Standing Status:   Future     Standing Expiration Date:   5/5/2023    TSH with Reflex     Standing Status:   Future     Standing Expiration Date:   5/5/2023   [facility] - Jack Camarillo MD, Neurology, [place]     Referral Priority:   Routine     Referral Type:   Eval and Treat     Referral Reason:   Specialty Services Required     Referred to Provider:   Ramírez Drew MD     Requested Specialty:   Neurology     Number of Visits Requested:   Erie Gilford, MD, Neurology, [place]     Referral Priority:   Routine     Referral Type:   Eval and Treat     Referral Reason:   Specialty Services Required     Referred to Provider:   Jeremy Martin MD     Requested Specialty:   Neurology     Number of Visits Requested:   1    61994 - [place] VISUAL SCREENING TEST, BILAT     No orders of the defined types were placed in this encounter. IF You get any s/s of stroke, etc he knows to call asap even if after hours. No headaches  No pain with chewing  No temple pain   If anything should change or worsen call ASAP, don't wait for next scheduled appointment. Return for Chronic condition management/appointment, worsening symptoms, call ASAP for appointment.       Tiff Fernandez MD

## 2022-05-11 ENCOUNTER — HOSPITAL ENCOUNTER (OUTPATIENT)
Dept: MRI IMAGING | Age: 82
Discharge: HOME | End: 2022-05-13
Payer: MEDICARE

## 2022-05-11 DIAGNOSIS — H53.461 RIGHT HOMONYMOUS HEMIANOPSIA: ICD-10-CM

## 2022-05-11 DIAGNOSIS — E78.5 HYPERLIPIDEMIA, UNSPECIFIED HYPERLIPIDEMIA TYPE: ICD-10-CM

## 2022-05-11 PROCEDURE — 70553 MRI BRAIN STEM W/O & W/DYE: CPT

## 2022-05-11 PROCEDURE — 6360000004 HC RX CONTRAST MEDICATION: Performed by: INTERNAL MEDICINE

## 2022-05-11 PROCEDURE — A9579 GAD-BASE MR CONTRAST NOS,1ML: HCPCS | Performed by: INTERNAL MEDICINE

## 2022-05-11 RX ORDER — SODIUM CHLORIDE 0.9 % (FLUSH) 0.9 %
10 SYRINGE (ML) INJECTION PRN
Status: DISCONTINUED | OUTPATIENT
Start: 2022-05-11 | End: 2022-05-14 | Stop reason: HOSPADM

## 2022-05-11 RX ADMIN — GADOTERIDOL 20 ML: 279.3 INJECTION, SOLUTION INTRAVENOUS at 09:59

## 2022-05-18 ENCOUNTER — TELEPHONE (OUTPATIENT)
Dept: FAMILY MEDICINE CLINIC | Age: 82
End: 2022-05-18

## 2022-05-18 ENCOUNTER — TELEMEDICINE (OUTPATIENT)
Dept: FAMILY MEDICINE CLINIC | Age: 82
End: 2022-05-18
Payer: MEDICARE

## 2022-05-18 DIAGNOSIS — Z86.73 HISTORY OF STROKE: Primary | ICD-10-CM

## 2022-05-18 DIAGNOSIS — I48.20 CHRONIC ATRIAL FIBRILLATION (HCC): ICD-10-CM

## 2022-05-18 PROCEDURE — 99442 PR PHYS/QHP TELEPHONE EVALUATION 11-20 MIN: CPT | Performed by: INTERNAL MEDICINE

## 2022-05-18 ASSESSMENT — ENCOUNTER SYMPTOMS
EYE PAIN: 0
SHORTNESS OF BREATH: 0
BACK PAIN: 0
ABDOMINAL PAIN: 0

## 2022-05-18 NOTE — PROGRESS NOTES
ctaSubjective:      Patient ID: Anthony Jaime is a [de-identified] y.o. male who presents today with:  No chief complaint on file. HPI   Here for follow up   No changes in his vision since the last time we talked. He denies any other acute changes   Past Medical History:   Diagnosis Date    CAD (coronary artery disease)     Gout     Hyperlipidemia     Hypertension     Osteoarthritis     Post-nasal drip      Past Surgical History:   Procedure Laterality Date    COLONOSCOPY  14    [facility]    CORONARY ANGIOPLASTY WITH STENT PLACEMENT      EXCISION / BIOPSY SKIN LESION OF LEG Left 2017    EVACUATION HEMATOMA LEFT LEG performed by Anna Durán MD at [facility]       Social History     Socioeconomic History    Marital status:      Spouse name: Not on file    Number of children: Not on file    Years of education: Not on file    Highest education level: Not on file   Occupational History    Not on file   Tobacco Use    Smoking status: Former Smoker     Packs/day: 0.00     Years: 50.00     Pack years: 0.00     Types: Cigars, Pipe     Quit date: 2001     Years since quittin.3    Smokeless tobacco: Never Used   Substance and Sexual Activity    Alcohol use: Yes     Alcohol/week: 1.0 standard drink     Types: 1 Cans of beer per week     Comment: Occasionally    Drug use: No    Sexual activity: Not on file   Other Topics Concern    Not on file   Social History Narrative    Not on file     Social Determinants of Health     Financial Resource Strain: Low Risk     Difficulty of Paying Living Expenses: Not hard at all   Food Insecurity: No Food Insecurity    Worried About [address] in the Last Year: Never true    [facility] in the Last Year: Never true   Transportation Needs:     Lack of Transportation (Medical): Not on file    Lack of Transportation (Non-Medical):  Not on file   Physical Activity:     Days of Exercise per Week: Not on file   Charge Payment of Exercise per Session: Not on file   Stress:     Feeling of Stress : Not on file   Social Connections:     Frequency of Communication with Friends and Family: Not on file    Frequency of Social Gatherings with Friends and Family: Not on file    Attends Christian Services: Not on file    Active Member of [address] [place] [address] or Organizations: Not on file    Attends Club or Organization Meetings: Not on file    Marital Status: Not on file   Intimate Partner Violence:     Fear of Current or Ex-Partner: Not on file    Emotionally Abused: Not on file    Physically Abused: Not on file    Sexually Abused: Not on file   Housing Stability:     Unable to Pay for Housing in the Last Year: Not on file    Number of [place] in the Last Year: Not on file    Unstable Housing in the Last Year: Not on file     Allergies   Allergen Reactions    Clonidine Derivatives     Hydrochlorothiazide     Losartan     Nitrofuran Derivatives     Pradaxa [Dabigatran Etexilate Mesylate]     Sular [Nisoldipine]     Xarelto [Rivaroxaban] Rash     Current Outpatient Medications on File Prior to Visit   Medication Sig Dispense Refill    amLODIPine (NORVASC) 5 MG tablet Take by mouth      metoprolol succinate (TOPROL XL) 100 MG extended release tablet Take 100 mg by mouth daily       apixaban (ELIQUIS) 5 MG TABS tablet Take 5 mg by mouth 2 times daily       telmisartan (MICARDIS) 80 MG tablet 80 mg daily       Misc Natural Products (BLACK CHERRY CONCENTRATE PO) Take by mouth      nitroGLYCERIN (NITROSTAT) 0.4 MG SL tablet Place 0.4 mg under the tongue every 5 minutes as needed.  aspirin 81 MG EC tablet Take 81 mg by mouth daily.  simvastatin (ZOCOR) 40 MG tablet Take 40 mg by mouth nightly.  furosemide (LASIX) 20 MG tablet Take 20 mg by mouth See Admin Instructions. Take one tab on Monday wed., and Friday         potassium chloride (KLOR-CON) 10 MEQ CR tablet Take 10 mEq by mouth daily.  Take one tab Monday wed and friday        No current facility-administered medications on file prior to visit. I have personally reviewed the ROS, PMH, PFH, and social history     Review of Systems   Constitutional: Negative for chills. HENT: Negative for congestion. Eyes: Negative for pain. Respiratory: Negative for shortness of breath. Cardiovascular: Negative for chest pain. Gastrointestinal: Negative for abdominal pain. Genitourinary: Negative for hematuria. Musculoskeletal: Negative for back pain. Allergic/Immunologic: Negative for immunocompromised state. Neurological: Negative for headaches. Psychiatric/Behavioral: Negative for hallucinations. Objective: There were no vitals taken for this visit. Physical Exam    Unable to perform given telephone visit. Assessment:       Diagnosis Orders   1. History of stroke  CTA HEAD W WO CONTRAST    CTA NECK W WO CONTRAST    ECHO Complete With Bubble Study   2. Chronic atrial fibrillation ([facility] [address]           Plan:    Telephone visit done because of coronavirus pandemic. Time spent. 15 Minutes   explained billeable visit, patient understands and agrees to continue  I was at home and patient was at home during this visit. Keep neuro fu   Please get carotid US done   Please get labs done   Fu opto as directed  He will drop off covid vaccine dates.    Consider fu colonoscopy in 05/2024              Orders Placed This Encounter   Procedures    CTA HEAD W WO CONTRAST     Standing Status:   Future     Standing Expiration Date:   5/20/2023     Order Specific Question:   STAT Creatinine as needed:     Answer:   No     Order Specific Question:   Reason for exam:     Answer:   HX OF STROKE    CTA NECK W WO CONTRAST     Standing Status:   Future     Standing Expiration Date:   5/20/2023     Order Specific Question:   STAT Creatinine as needed:     Answer:   No     Order Specific Question:   Reason for exam:     Answer:   Hx of stroke    ECHO Complete With Bubble Study     Standing Status:   Future     Standing Expiration Date:   11/20/2022     Order Specific Question:   Reason for exam:     Answer:   history of stroke     No orders of the defined types were placed in this encounter. If any new, worsening symptoms he knows to go to ER. He denies any of this. Continue asa/statin/eliquis   If anything should change or worsen call ASAP, don't wait for next scheduled appointment. Return in about 6 weeks (around 6/29/2022) for Chronic condition management/appointment, worsening symptoms, call ASAP for appointment.       Roxanna Henao MD

## 2022-05-19 ENCOUNTER — HOSPITAL ENCOUNTER (OUTPATIENT)
Dept: ULTRASOUND IMAGING | Age: 82
Discharge: HOME | End: 2022-05-21
Payer: MEDICARE

## 2022-05-19 DIAGNOSIS — H53.461 RIGHT HOMONYMOUS HEMIANOPSIA: ICD-10-CM

## 2022-05-19 DIAGNOSIS — E78.5 HYPERLIPIDEMIA, UNSPECIFIED HYPERLIPIDEMIA TYPE: ICD-10-CM

## 2022-05-19 LAB
BASOPHILS ABSOLUTE: 0.1 K/UL (ref 0–0.2)
BASOPHILS RELATIVE PERCENT: 0.7 %
BILIRUB SERPL-MCNC: 0.6 MG/DL (ref 0.2–0.7)
BILIRUBIN DIRECT: <0.2 MG/DL (ref 0–0.4)
BILIRUBIN, INDIRECT: NORMAL MG/DL (ref 0–0.6)
EOSINOPHILS ABSOLUTE: 0.4 K/UL (ref 0–0.7)
EOSINOPHILS RELATIVE PERCENT: 5.2 %
HCT VFR BLD CALC: 41.8 % (ref 42–52)
HEMOGLOBIN: 14.2 G/DL (ref 14–18)
LYMPHOCYTES ABSOLUTE: 2.3 K/UL (ref 1–4.8)
LYMPHOCYTES RELATIVE PERCENT: 27.2 %
MCH RBC QN AUTO: 32 PG (ref 27–31.3)
MCHC RBC AUTO-ENTMCNC: 34 % (ref 33–37)
MCV RBC AUTO: 94.1 FL (ref 80–100)
MONOCYTES ABSOLUTE: 0.9 K/UL (ref 0.2–0.8)
MONOCYTES RELATIVE PERCENT: 10.1 %
NEUTROPHILS ABSOLUTE: 4.9 K/UL (ref 1.4–6.5)
NEUTROPHILS RELATIVE PERCENT: 56.8 %
PDW BLD-RTO: 13.5 % (ref 11.5–14.5)
PLATELET # BLD: 150 K/UL (ref 130–400)
RBC # BLD: 4.44 M/UL (ref 4.7–6.1)
TSH REFLEX: 1.18 UIU/ML (ref 0.44–3.86)
WBC # BLD: 8.6 K/UL (ref 4.8–10.8)

## 2022-05-19 PROCEDURE — 93880 EXTRACRANIAL BILAT STUDY: CPT

## 2022-05-19 NOTE — TELEPHONE ENCOUNTER
he does not need to be sooner as this is a old infarct. Patient should have a CT angiograms of the intra and extracranial circulation.   Patient should be maximized on antiplatelet anticoagulation and continue on Eliquis 5 mg twice a day and aspirin

## 2022-05-19 NOTE — TELEPHONE ENCOUNTER
I spoke with dr Ny Winston  He would like to do CTA studies head and neck, echo, and cancel the US  Please call patient  If agreeable, I will change order

## 2022-05-20 NOTE — TELEPHONE ENCOUNTER
Now that he has already had the US, if he asks what is \"internal carotid arteries have less than 50 stenosis bilaterally\" what is that?

## 2022-05-20 NOTE — TELEPHONE ENCOUNTER
cta head and neck and echo tests have been ordered please make sure they are scheduled    The us carotid test means that he doesn't have any clinically significant blockages in those particular vessels  But there are other partially occluded vessels which could have caused this

## 2022-05-23 LAB
ALK PHOS OTHER CALC: 0 U/L
ALK PHOSPHATASE: 125 U/L (ref 40–120)
ALKALINE PHOSPHATASE BONE FRACTION: 31 U/L (ref 0–55)
ALKALINE PHOSPHATASE LIVER FRACTION: 94 U/L (ref 0–94)

## 2022-06-02 ENCOUNTER — HOSPITAL ENCOUNTER (OUTPATIENT)
Dept: CT IMAGING | Age: 82
Discharge: HOME | End: 2022-06-04
Payer: MEDICARE

## 2022-06-02 DIAGNOSIS — Z86.73 HISTORY OF STROKE: ICD-10-CM

## 2022-06-02 PROCEDURE — 70498 CT ANGIOGRAPHY NECK: CPT

## 2022-06-02 PROCEDURE — 6360000004 HC RX CONTRAST MEDICATION: Performed by: INTERNAL MEDICINE

## 2022-06-02 PROCEDURE — 70496 CT ANGIOGRAPHY HEAD: CPT

## 2022-06-02 RX ADMIN — IOPAMIDOL 100 ML: 612 INJECTION, SOLUTION INTRAVENOUS at 09:08

## 2022-06-06 LAB
GFR AFRICAN AMERICAN: >60
GFR NON-AFRICAN AMERICAN: >60
PERFORMED ON: NORMAL
POC CREATININE: 0.8 MG/DL (ref 0.8–1.3)
POC SAMPLE TYPE: NORMAL

## 2022-07-27 LAB
ALBUMIN SERPL-MCNC: 4.2 G/DL (ref 3.5–4.6)
ALP BLD-CCNC: 99 U/L (ref 35–104)
ALT SERPL-CCNC: 31 U/L (ref 0–41)
ANION GAP SERPL CALCULATED.3IONS-SCNC: 13 MEQ/L (ref 9–15)
AST SERPL-CCNC: 27 U/L (ref 0–40)
BILIRUB SERPL-MCNC: 0.9 MG/DL (ref 0.2–0.7)
BUN BLDV-MCNC: 17 MG/DL (ref 8–23)
CALCIUM SERPL-MCNC: 9.2 MG/DL (ref 8.5–9.9)
CHLORIDE BLD-SCNC: 104 MEQ/L (ref 95–107)
CHOLESTEROL, TOTAL: 110 MG/DL (ref 0–199)
CO2: 26 MEQ/L (ref 20–31)
CREAT SERPL-MCNC: 0.77 MG/DL (ref 0.7–1.2)
GFR AFRICAN AMERICAN: >60
GFR NON-AFRICAN AMERICAN: >60
GLOBULIN: 3 G/DL (ref 2.3–3.5)
GLUCOSE BLD-MCNC: 104 MG/DL (ref 70–99)
HDLC SERPL-MCNC: 50 MG/DL (ref 40–59)
LDL CHOLESTEROL CALCULATED: 42 MG/DL (ref 0–129)
POTASSIUM SERPL-SCNC: 4 MEQ/L (ref 3.4–4.9)
SODIUM BLD-SCNC: 143 MEQ/L (ref 135–144)
TOTAL PROTEIN: 7.2 G/DL (ref 6.3–8)
TRIGL SERPL-MCNC: 92 MG/DL (ref 0–150)

## 2022-07-28 PROBLEM — I49.3 VENTRICULAR PREMATURE BEATS: Status: ACTIVE | Noted: 2022-07-28

## 2022-07-28 PROBLEM — R21 RASH: Status: ACTIVE | Noted: 2022-07-28

## 2022-07-28 PROBLEM — I25.2 HISTORY OF MYOCARDIAL INFARCTION: Status: ACTIVE | Noted: 2022-07-28

## 2022-07-28 PROBLEM — R60.9 EDEMA: Status: ACTIVE | Noted: 2022-07-28

## 2022-07-28 PROBLEM — I73.9 INTERMITTENT CLAUDICATION (HCC): Status: ACTIVE | Noted: 2022-07-28

## 2022-07-28 PROBLEM — E66.9 OBESITY: Status: ACTIVE | Noted: 2022-07-28

## 2022-07-28 PROBLEM — R51.9 HEADACHE: Status: ACTIVE | Noted: 2022-07-28

## 2022-07-28 PROBLEM — I10 BENIGN ESSENTIAL HYPERTENSION: Status: ACTIVE | Noted: 2018-03-13

## 2022-08-09 ENCOUNTER — TELEPHONE (OUTPATIENT)
Dept: PRIMARY CARE CLINIC | Age: 82
End: 2022-08-09

## 2023-02-04 LAB
ALBUMIN SERPL-MCNC: 4.1 G/DL (ref 3.5–4.6)
ALP BLD-CCNC: 98 U/L (ref 35–104)
ALT SERPL-CCNC: 27 U/L (ref 0–41)
ANION GAP SERPL CALCULATED.3IONS-SCNC: 9 MEQ/L (ref 9–15)
AST SERPL-CCNC: 26 U/L (ref 0–40)
BILIRUB SERPL-MCNC: 0.9 MG/DL (ref 0.2–0.7)
BUN BLDV-MCNC: 14 MG/DL (ref 8–23)
CALCIUM SERPL-MCNC: 8.8 MG/DL (ref 8.5–9.9)
CHLORIDE BLD-SCNC: 101 MEQ/L (ref 95–107)
CHOLESTEROL, TOTAL: 107 MG/DL (ref 0–199)
CO2: 27 MEQ/L (ref 20–31)
CREAT SERPL-MCNC: 0.82 MG/DL (ref 0.7–1.2)
GFR SERPL CREATININE-BSD FRML MDRD: >60 ML/MIN/{1.73_M2}
GLOBULIN: 2.7 G/DL (ref 2.3–3.5)
GLUCOSE BLD-MCNC: 96 MG/DL (ref 70–99)
HDLC SERPL-MCNC: 50 MG/DL (ref 40–59)
LDL CHOLESTEROL CALCULATED: 41 MG/DL (ref 0–129)
POTASSIUM SERPL-SCNC: 3.9 MEQ/L (ref 3.4–4.9)
SODIUM BLD-SCNC: 137 MEQ/L (ref 135–144)
TOTAL PROTEIN: 6.8 G/DL (ref 6.3–8)
TRIGL SERPL-MCNC: 82 MG/DL (ref 0–150)

## 2023-03-11 PROBLEM — E66.811 CLASS 1 OBESITY WITH BODY MASS INDEX (BMI) OF 30.0 TO 30.9 IN ADULT: Status: ACTIVE | Noted: 2023-03-11

## 2023-03-11 PROBLEM — R60.9 EDEMA: Status: ACTIVE | Noted: 2023-03-11

## 2023-03-11 PROBLEM — I10 BENIGN ESSENTIAL HYPERTENSION: Status: ACTIVE | Noted: 2023-03-11

## 2023-03-11 PROBLEM — I73.9 INTERMITTENT CLAUDICATION (CMS-HCC): Status: ACTIVE | Noted: 2023-03-11

## 2023-03-11 PROBLEM — E78.5 HYPERLIPIDEMIA: Status: ACTIVE | Noted: 2023-03-11

## 2023-03-11 PROBLEM — R21 SKIN RASH: Status: ACTIVE | Noted: 2023-03-11

## 2023-03-11 PROBLEM — Z98.61 S/P PTCA (PERCUTANEOUS TRANSLUMINAL CORONARY ANGIOPLASTY): Status: ACTIVE | Noted: 2023-03-11

## 2023-03-11 PROBLEM — E66.9 CLASS 1 OBESITY WITH BODY MASS INDEX (BMI) OF 30.0 TO 30.9 IN ADULT: Status: ACTIVE | Noted: 2023-03-11

## 2023-03-11 PROBLEM — I48.21 PERMANENT ATRIAL FIBRILLATION (MULTI): Status: ACTIVE | Noted: 2023-03-11

## 2023-03-11 PROBLEM — R51.9 HEADACHE: Status: ACTIVE | Noted: 2023-03-11

## 2023-03-11 PROBLEM — I87.2 VENOUS INSUFFICIENCY: Status: ACTIVE | Noted: 2023-03-11

## 2023-03-11 PROBLEM — I25.2 PAST MYOCARDIAL INFARCTION: Status: ACTIVE | Noted: 2023-03-11

## 2023-03-11 PROBLEM — I49.3 PVC (PREMATURE VENTRICULAR CONTRACTION): Status: ACTIVE | Noted: 2023-03-11

## 2023-03-11 PROBLEM — I25.10 ATHEROSCLEROSIS OF NATIVE CORONARY ARTERY OF NATIVE HEART WITHOUT ANGINA PECTORIS: Status: ACTIVE | Noted: 2023-03-11

## 2023-03-11 RX ORDER — METOPROLOL SUCCINATE 100 MG/1
1 TABLET, EXTENDED RELEASE ORAL DAILY
COMMUNITY
Start: 2022-05-09 | End: 2024-01-29

## 2023-03-11 RX ORDER — POTASSIUM CHLORIDE 750 MG/1
10 TABLET, FILM COATED, EXTENDED RELEASE ORAL
COMMUNITY
Start: 2022-03-09 | End: 2024-01-29

## 2023-03-11 RX ORDER — ASPIRIN 81 MG/1
1 TABLET ORAL DAILY
COMMUNITY

## 2023-03-11 RX ORDER — NITROGLYCERIN 0.4 MG/1
TABLET SUBLINGUAL
COMMUNITY

## 2023-03-11 RX ORDER — AMLODIPINE BESYLATE 5 MG/1
1.5 TABLET ORAL DAILY
COMMUNITY
Start: 2022-08-01 | End: 2023-03-17 | Stop reason: SDUPTHER

## 2023-03-11 RX ORDER — ATORVASTATIN CALCIUM 40 MG/1
1 TABLET, FILM COATED ORAL NIGHTLY
COMMUNITY
Start: 2022-03-07 | End: 2024-02-22 | Stop reason: SDUPTHER

## 2023-03-11 RX ORDER — FUROSEMIDE 20 MG/1
TABLET ORAL
COMMUNITY
Start: 2022-08-01 | End: 2024-01-17

## 2023-03-11 RX ORDER — TELMISARTAN 80 MG/1
1 TABLET ORAL DAILY
COMMUNITY
End: 2024-01-17

## 2023-03-17 ENCOUNTER — OFFICE VISIT (OUTPATIENT)
Dept: PRIMARY CARE | Facility: CLINIC | Age: 83
End: 2023-03-17
Payer: MEDICARE

## 2023-03-17 VITALS
OXYGEN SATURATION: 98 % | SYSTOLIC BLOOD PRESSURE: 133 MMHG | RESPIRATION RATE: 16 BRPM | TEMPERATURE: 98.3 F | HEIGHT: 65 IN | HEART RATE: 67 BPM | DIASTOLIC BLOOD PRESSURE: 73 MMHG | WEIGHT: 188.6 LBS | BODY MASS INDEX: 31.42 KG/M2

## 2023-03-17 DIAGNOSIS — Z00.00 ROUTINE GENERAL MEDICAL EXAMINATION AT A HEALTH CARE FACILITY: Primary | ICD-10-CM

## 2023-03-17 DIAGNOSIS — E66.09 CLASS 1 OBESITY DUE TO EXCESS CALORIES WITH SERIOUS COMORBIDITY AND BODY MASS INDEX (BMI) OF 31.0 TO 31.9 IN ADULT: ICD-10-CM

## 2023-03-17 DIAGNOSIS — E78.2 MIXED HYPERLIPIDEMIA: ICD-10-CM

## 2023-03-17 DIAGNOSIS — I10 PRIMARY HYPERTENSION: ICD-10-CM

## 2023-03-17 PROBLEM — I73.9 INTERMITTENT CLAUDICATION (CMS-HCC): Status: RESOLVED | Noted: 2023-03-11 | Resolved: 2023-03-17

## 2023-03-17 PROCEDURE — 1170F FXNL STATUS ASSESSED: CPT | Performed by: FAMILY MEDICINE

## 2023-03-17 PROCEDURE — 3078F DIAST BP <80 MM HG: CPT | Performed by: FAMILY MEDICINE

## 2023-03-17 PROCEDURE — 1160F RVW MEDS BY RX/DR IN RCRD: CPT | Performed by: FAMILY MEDICINE

## 2023-03-17 PROCEDURE — 1159F MED LIST DOCD IN RCRD: CPT | Performed by: FAMILY MEDICINE

## 2023-03-17 PROCEDURE — 1036F TOBACCO NON-USER: CPT | Performed by: FAMILY MEDICINE

## 2023-03-17 PROCEDURE — 99214 OFFICE O/P EST MOD 30 MIN: CPT | Performed by: FAMILY MEDICINE

## 2023-03-17 PROCEDURE — 3075F SYST BP GE 130 - 139MM HG: CPT | Performed by: FAMILY MEDICINE

## 2023-03-17 PROCEDURE — G0439 PPPS, SUBSEQ VISIT: HCPCS | Performed by: FAMILY MEDICINE

## 2023-03-17 RX ORDER — AMLODIPINE BESYLATE 2.5 MG/1
7.5 TABLET ORAL DAILY
Qty: 270 TABLET | Refills: 3 | Status: SHIPPED | OUTPATIENT
Start: 2023-03-17 | End: 2024-01-17

## 2023-03-17 ASSESSMENT — ENCOUNTER SYMPTOMS
DYSURIA: 0
DIARRHEA: 0
COUGH: 0
NUMBNESS: 0
SINUS PRESSURE: 0
FEVER: 0
WHEEZING: 0
WOUND: 0
FACIAL SWELLING: 0
DYSPHORIC MOOD: 0
CONSTIPATION: 0
SINUS PAIN: 0
EYE PAIN: 0
CONFUSION: 0
HEADACHES: 0
ADENOPATHY: 0
ACTIVITY CHANGE: 0
HEMATURIA: 0
JOINT SWELLING: 0
TREMORS: 0
ABDOMINAL DISTENTION: 0
BACK PAIN: 0
VOMITING: 0
CHILLS: 0
NECK PAIN: 0
FATIGUE: 0
BLOOD IN STOOL: 0
CHOKING: 0
BRUISES/BLEEDS EASILY: 0
NAUSEA: 0
FREQUENCY: 0
EYE ITCHING: 0
NECK STIFFNESS: 0
FLANK PAIN: 0
MYALGIAS: 0
POLYDIPSIA: 0
HYPERTENSION: 1
SLEEP DISTURBANCE: 0
DEPRESSION: 0
PALPITATIONS: 0
OCCASIONAL FEELINGS OF UNSTEADINESS: 0
EYE DISCHARGE: 0
RHINORRHEA: 0
DIZZINESS: 0
APPETITE CHANGE: 0
ABDOMINAL PAIN: 0
SORE THROAT: 0
PHOTOPHOBIA: 0
LOSS OF SENSATION IN FEET: 0
NERVOUS/ANXIOUS: 0
HALLUCINATIONS: 0
SEIZURES: 0
AGITATION: 0
CHEST TIGHTNESS: 0
WEAKNESS: 0
DIAPHORESIS: 0
LIGHT-HEADEDNESS: 0
FACIAL ASYMMETRY: 0
SHORTNESS OF BREATH: 0
UNEXPECTED WEIGHT CHANGE: 0
SPEECH DIFFICULTY: 0
VOICE CHANGE: 0
TROUBLE SWALLOWING: 0
ARTHRALGIAS: 0
EYE REDNESS: 0

## 2023-03-17 ASSESSMENT — PATIENT HEALTH QUESTIONNAIRE - PHQ9
1. LITTLE INTEREST OR PLEASURE IN DOING THINGS: NOT AT ALL
SUM OF ALL RESPONSES TO PHQ9 QUESTIONS 1 AND 2: 0
2. FEELING DOWN, DEPRESSED OR HOPELESS: NOT AT ALL

## 2023-03-17 ASSESSMENT — ACTIVITIES OF DAILY LIVING (ADL)
DRESSING: INDEPENDENT
TAKING_MEDICATION: INDEPENDENT
GROCERY_SHOPPING: INDEPENDENT
DOING_HOUSEWORK: INDEPENDENT
BATHING: INDEPENDENT
MANAGING_FINANCES: INDEPENDENT

## 2023-04-17 ENCOUNTER — TELEPHONE (OUTPATIENT)
Dept: FAMILY MEDICINE CLINIC | Age: 83
End: 2023-04-17

## 2023-08-16 LAB
ALBUMIN SERPL-MCNC: 4.5 G/DL (ref 3.5–4.6)
ALP SERPL-CCNC: 103 U/L (ref 35–104)
ALT SERPL-CCNC: 27 U/L (ref 0–41)
ANION GAP SERPL CALCULATED.3IONS-SCNC: 12 MEQ/L (ref 9–15)
AST SERPL-CCNC: 26 U/L (ref 0–40)
BILIRUB SERPL-MCNC: 1 MG/DL (ref 0.2–0.7)
BUN SERPL-MCNC: 11 MG/DL (ref 8–23)
CALCIUM SERPL-MCNC: 9.2 MG/DL (ref 8.5–9.9)
CHLORIDE SERPL-SCNC: 102 MEQ/L (ref 95–107)
CHOLEST SERPL-MCNC: 123 MG/DL (ref 0–199)
CO2 SERPL-SCNC: 26 MEQ/L (ref 20–31)
CREAT SERPL-MCNC: 0.68 MG/DL (ref 0.7–1.2)
ERYTHROCYTE [DISTWIDTH] IN BLOOD BY AUTOMATED COUNT: 13 % (ref 11.5–14.5)
GLOBULIN SER CALC-MCNC: 2.9 G/DL (ref 2.3–3.5)
GLUCOSE SERPL-MCNC: 105 MG/DL (ref 70–99)
HCT VFR BLD AUTO: 44.5 % (ref 42–52)
HDLC SERPL-MCNC: 53 MG/DL (ref 40–59)
HGB BLD-MCNC: 15.2 G/DL (ref 14–18)
LDLC SERPL CALC-MCNC: 53 MG/DL (ref 0–129)
MCH RBC QN AUTO: 32.2 PG (ref 27–31.3)
MCHC RBC AUTO-ENTMCNC: 34.2 % (ref 33–37)
MCV RBC AUTO: 94 FL (ref 79–92.2)
PLATELET # BLD AUTO: 158 K/UL (ref 130–400)
POTASSIUM SERPL-SCNC: 3.9 MEQ/L (ref 3.4–4.9)
PROT SERPL-MCNC: 7.4 G/DL (ref 6.3–8)
RBC # BLD AUTO: 4.73 M/UL (ref 4.7–6.1)
SODIUM SERPL-SCNC: 140 MEQ/L (ref 135–144)
TRIGL SERPL-MCNC: 87 MG/DL (ref 0–150)
WBC # BLD AUTO: 9.2 K/UL (ref 4.8–10.8)

## 2023-09-14 PROBLEM — D68.32 HEMORRHAGIC DISORDER DUE TO EXTRINSIC CIRCULATING ANTICOAGULANTS (MULTI): Chronic | Status: RESOLVED | Noted: 2017-11-08 | Resolved: 2023-09-14

## 2023-09-14 PROBLEM — D69.59 THROMBOCYTOPENIA DUE TO BLOOD LOSS: Status: RESOLVED | Noted: 2017-11-08 | Resolved: 2023-09-14

## 2023-09-14 PROBLEM — S81.802A TRAUMATIC OPEN WOUND OF LEFT LOWER LEG: Status: RESOLVED | Noted: 2018-01-17 | Resolved: 2023-09-14

## 2023-09-14 PROBLEM — D62 ANEMIA ASSOCIATED WITH ACUTE BLOOD LOSS: Status: RESOLVED | Noted: 2017-11-08 | Resolved: 2023-09-14

## 2023-09-15 ENCOUNTER — LAB (OUTPATIENT)
Dept: LAB | Facility: LAB | Age: 83
End: 2023-09-15
Payer: MEDICARE

## 2023-09-15 DIAGNOSIS — I10 PRIMARY HYPERTENSION: ICD-10-CM

## 2023-09-15 DIAGNOSIS — E66.09 CLASS 1 OBESITY DUE TO EXCESS CALORIES WITH SERIOUS COMORBIDITY AND BODY MASS INDEX (BMI) OF 31.0 TO 31.9 IN ADULT: ICD-10-CM

## 2023-09-15 DIAGNOSIS — E78.2 MIXED HYPERLIPIDEMIA: ICD-10-CM

## 2023-09-15 LAB
ALANINE AMINOTRANSFERASE (SGPT) (U/L) IN SER/PLAS: 25 U/L (ref 10–52)
ALBUMIN (G/DL) IN SER/PLAS: 4.3 G/DL (ref 3.4–5)
ALKALINE PHOSPHATASE (U/L) IN SER/PLAS: 90 U/L (ref 33–136)
ANION GAP IN SER/PLAS: 14 MMOL/L (ref 10–20)
ASPARTATE AMINOTRANSFERASE (SGOT) (U/L) IN SER/PLAS: 25 U/L (ref 9–39)
BASOPHILS (10*3/UL) IN BLOOD BY AUTOMATED COUNT: 0.06 X10E9/L (ref 0–0.1)
BASOPHILS/100 LEUKOCYTES IN BLOOD BY AUTOMATED COUNT: 0.7 % (ref 0–2)
BILIRUBIN TOTAL (MG/DL) IN SER/PLAS: 1.1 MG/DL (ref 0–1.2)
CALCIUM (MG/DL) IN SER/PLAS: 9.1 MG/DL (ref 8.6–10.3)
CARBON DIOXIDE, TOTAL (MMOL/L) IN SER/PLAS: 27 MMOL/L (ref 21–32)
CHLORIDE (MMOL/L) IN SER/PLAS: 100 MMOL/L (ref 98–107)
CHOLESTEROL (MG/DL) IN SER/PLAS: 104 MG/DL (ref 0–199)
CHOLESTEROL IN HDL (MG/DL) IN SER/PLAS: 44.3 MG/DL
CHOLESTEROL/HDL RATIO: 2.3
CREATININE (MG/DL) IN SER/PLAS: 0.88 MG/DL (ref 0.5–1.3)
EOSINOPHILS (10*3/UL) IN BLOOD BY AUTOMATED COUNT: 0.37 X10E9/L (ref 0–0.4)
EOSINOPHILS/100 LEUKOCYTES IN BLOOD BY AUTOMATED COUNT: 4.4 % (ref 0–6)
ERYTHROCYTE DISTRIBUTION WIDTH (RATIO) BY AUTOMATED COUNT: 12 % (ref 11.5–14.5)
ERYTHROCYTE MEAN CORPUSCULAR HEMOGLOBIN CONCENTRATION (G/DL) BY AUTOMATED: 33.8 G/DL (ref 32–36)
ERYTHROCYTE MEAN CORPUSCULAR VOLUME (FL) BY AUTOMATED COUNT: 94 FL (ref 80–100)
ERYTHROCYTES (10*6/UL) IN BLOOD BY AUTOMATED COUNT: 4.5 X10E12/L (ref 4.5–5.9)
GFR MALE: 86 ML/MIN/1.73M2
GLUCOSE (MG/DL) IN SER/PLAS: 103 MG/DL (ref 74–99)
HEMATOCRIT (%) IN BLOOD BY AUTOMATED COUNT: 42.3 % (ref 41–52)
HEMOGLOBIN (G/DL) IN BLOOD: 14.3 G/DL (ref 13.5–17.5)
IMMATURE GRANULOCYTES/100 LEUKOCYTES IN BLOOD BY AUTOMATED COUNT: 0.6 % (ref 0–0.9)
LDL: 40 MG/DL (ref 0–99)
LEUKOCYTES (10*3/UL) IN BLOOD BY AUTOMATED COUNT: 8.3 X10E9/L (ref 4.4–11.3)
LYMPHOCYTES (10*3/UL) IN BLOOD BY AUTOMATED COUNT: 2.4 X10E9/L (ref 0.8–3)
LYMPHOCYTES/100 LEUKOCYTES IN BLOOD BY AUTOMATED COUNT: 28.8 % (ref 13–44)
MAGNESIUM (MG/DL) IN SER/PLAS: 2.06 MG/DL (ref 1.6–2.4)
MONOCYTES (10*3/UL) IN BLOOD BY AUTOMATED COUNT: 0.9 X10E9/L (ref 0.05–0.8)
MONOCYTES/100 LEUKOCYTES IN BLOOD BY AUTOMATED COUNT: 10.8 % (ref 2–10)
NEUTROPHILS (10*3/UL) IN BLOOD BY AUTOMATED COUNT: 4.55 X10E9/L (ref 1.6–5.5)
NEUTROPHILS/100 LEUKOCYTES IN BLOOD BY AUTOMATED COUNT: 54.7 % (ref 40–80)
PLATELETS (10*3/UL) IN BLOOD AUTOMATED COUNT: 140 X10E9/L (ref 150–450)
POTASSIUM (MMOL/L) IN SER/PLAS: 4.2 MMOL/L (ref 3.5–5.3)
PROTEIN TOTAL: 7.4 G/DL (ref 6.4–8.2)
SODIUM (MMOL/L) IN SER/PLAS: 137 MMOL/L (ref 136–145)
THYROTROPIN (MIU/L) IN SER/PLAS BY DETECTION LIMIT <= 0.05 MIU/L: 0.91 MIU/L (ref 0.44–3.98)
TRIGLYCERIDE (MG/DL) IN SER/PLAS: 97 MG/DL (ref 0–149)
UREA NITROGEN (MG/DL) IN SER/PLAS: 13 MG/DL (ref 6–23)
VLDL: 19 MG/DL (ref 0–40)

## 2023-09-15 PROCEDURE — 85025 COMPLETE CBC W/AUTO DIFF WBC: CPT

## 2023-09-15 PROCEDURE — 36415 COLL VENOUS BLD VENIPUNCTURE: CPT

## 2023-09-15 PROCEDURE — 80053 COMPREHEN METABOLIC PANEL: CPT

## 2023-09-15 PROCEDURE — 84443 ASSAY THYROID STIM HORMONE: CPT

## 2023-09-15 PROCEDURE — 83735 ASSAY OF MAGNESIUM: CPT

## 2023-09-15 PROCEDURE — 80061 LIPID PANEL: CPT

## 2023-09-18 ENCOUNTER — OFFICE VISIT (OUTPATIENT)
Dept: PRIMARY CARE | Facility: CLINIC | Age: 83
End: 2023-09-18
Payer: MEDICARE

## 2023-09-18 VITALS
HEART RATE: 78 BPM | OXYGEN SATURATION: 96 % | RESPIRATION RATE: 16 BRPM | HEIGHT: 65 IN | BODY MASS INDEX: 30.32 KG/M2 | WEIGHT: 182 LBS | TEMPERATURE: 97.9 F | DIASTOLIC BLOOD PRESSURE: 77 MMHG | SYSTOLIC BLOOD PRESSURE: 129 MMHG

## 2023-09-18 DIAGNOSIS — E78.2 MIXED HYPERLIPIDEMIA: ICD-10-CM

## 2023-09-18 DIAGNOSIS — I10 PRIMARY HYPERTENSION: Primary | ICD-10-CM

## 2023-09-18 DIAGNOSIS — E66.09 CLASS 1 OBESITY DUE TO EXCESS CALORIES WITH SERIOUS COMORBIDITY AND BODY MASS INDEX (BMI) OF 30.0 TO 30.9 IN ADULT: ICD-10-CM

## 2023-09-18 DIAGNOSIS — I48.21 PERMANENT ATRIAL FIBRILLATION (MULTI): ICD-10-CM

## 2023-09-18 DIAGNOSIS — Z23 ENCOUNTER FOR IMMUNIZATION: ICD-10-CM

## 2023-09-18 PROBLEM — R60.9 EDEMA: Status: RESOLVED | Noted: 2023-03-11 | Resolved: 2023-09-18

## 2023-09-18 PROBLEM — R21 SKIN RASH: Status: RESOLVED | Noted: 2023-03-11 | Resolved: 2023-09-18

## 2023-09-18 PROBLEM — R51.9 HEADACHE: Status: RESOLVED | Noted: 2023-03-11 | Resolved: 2023-09-18

## 2023-09-18 PROBLEM — I49.3 PVC (PREMATURE VENTRICULAR CONTRACTION): Status: RESOLVED | Noted: 2023-03-11 | Resolved: 2023-09-18

## 2023-09-18 PROCEDURE — 1159F MED LIST DOCD IN RCRD: CPT | Performed by: FAMILY MEDICINE

## 2023-09-18 PROCEDURE — G0008 ADMIN INFLUENZA VIRUS VAC: HCPCS | Performed by: FAMILY MEDICINE

## 2023-09-18 PROCEDURE — 3078F DIAST BP <80 MM HG: CPT | Performed by: FAMILY MEDICINE

## 2023-09-18 PROCEDURE — 3074F SYST BP LT 130 MM HG: CPT | Performed by: FAMILY MEDICINE

## 2023-09-18 PROCEDURE — 1036F TOBACCO NON-USER: CPT | Performed by: FAMILY MEDICINE

## 2023-09-18 PROCEDURE — 90662 IIV NO PRSV INCREASED AG IM: CPT | Performed by: FAMILY MEDICINE

## 2023-09-18 PROCEDURE — 99214 OFFICE O/P EST MOD 30 MIN: CPT | Performed by: FAMILY MEDICINE

## 2023-09-18 PROCEDURE — 1160F RVW MEDS BY RX/DR IN RCRD: CPT | Performed by: FAMILY MEDICINE

## 2023-09-18 ASSESSMENT — ENCOUNTER SYMPTOMS
CONFUSION: 0
UNEXPECTED WEIGHT CHANGE: 0
FACIAL ASYMMETRY: 0
HEADACHES: 0
AGITATION: 0
TREMORS: 0
MYALGIAS: 0
BACK PAIN: 0
VOMITING: 0
SORE THROAT: 0
RHINORRHEA: 0
SLEEP DISTURBANCE: 0
HYPERTENSION: 1
VOICE CHANGE: 0
NAUSEA: 0
JOINT SWELLING: 0
DIARRHEA: 0
EYE PAIN: 0
APPETITE CHANGE: 0
PALPITATIONS: 0
COUGH: 0
WEAKNESS: 0
SINUS PAIN: 0
ACTIVITY CHANGE: 0
NERVOUS/ANXIOUS: 0
EYE DISCHARGE: 0
CHOKING: 0
WOUND: 0
SPEECH DIFFICULTY: 0
CONSTIPATION: 0
FLANK PAIN: 0
ABDOMINAL DISTENTION: 0
LIGHT-HEADEDNESS: 0
DYSURIA: 0
SHORTNESS OF BREATH: 0
NECK PAIN: 0
FREQUENCY: 0
BLOOD IN STOOL: 0
SINUS PRESSURE: 0
ADENOPATHY: 0
NECK STIFFNESS: 0
NUMBNESS: 0
PHOTOPHOBIA: 0
EYE REDNESS: 0
ABDOMINAL PAIN: 0
HALLUCINATIONS: 0
FACIAL SWELLING: 0
BRUISES/BLEEDS EASILY: 0
ARTHRALGIAS: 0
SEIZURES: 0
DIZZINESS: 0
DYSPHORIC MOOD: 0
DIAPHORESIS: 0
CHILLS: 0
FATIGUE: 0
CHEST TIGHTNESS: 0
POLYDIPSIA: 0
TROUBLE SWALLOWING: 0
EYE ITCHING: 0
FEVER: 0
HEMATURIA: 0
WHEEZING: 0

## 2023-09-18 NOTE — PROGRESS NOTES
Subjective   Patient ID: Curt Giang is a 83 y.o. male who presents for Follow-up (6 month- review labs. ).    Patient c/o occasional foot pain in both feet.     Hypertension  This is a chronic problem. The current episode started more than 1 year ago. The problem is unchanged. The problem is controlled. Associated symptoms include peripheral edema. Pertinent negatives include no chest pain, headaches, neck pain, palpitations or shortness of breath. There are no associated agents to hypertension. Risk factors for coronary artery disease include dyslipidemia, male gender and obesity. Past treatments include diuretics, calcium channel blockers, beta blockers and angiotensin blockers. The current treatment provides significant improvement. There are no compliance problems.  Hypertensive end-organ damage includes CAD/MI. There is no history of a hypertension causing med or a thyroid problem.   Hyperlipidemia  This is a chronic problem. The current episode started more than 1 year ago. The problem is controlled. Recent lipid tests were reviewed and are normal. Factors aggravating his hyperlipidemia include beta blockers. Pertinent negatives include no chest pain, myalgias or shortness of breath. Current antihyperlipidemic treatment includes statins. The current treatment provides significant improvement of lipids. There are no compliance problems.         Review of Systems   Constitutional:  Negative for activity change, appetite change, chills, diaphoresis, fatigue, fever and unexpected weight change.   HENT:  Negative for congestion, dental problem, drooling, ear discharge, ear pain, facial swelling, hearing loss, mouth sores, nosebleeds, postnasal drip, rhinorrhea, sinus pressure, sinus pain, sneezing, sore throat, tinnitus, trouble swallowing and voice change.    Eyes:  Negative for photophobia, pain, discharge, redness, itching and visual disturbance.   Respiratory:  Negative for cough, choking, chest  "tightness, shortness of breath and wheezing.    Cardiovascular:  Positive for leg swelling. Negative for chest pain and palpitations.   Gastrointestinal:  Negative for abdominal distention, abdominal pain, blood in stool, constipation, diarrhea, nausea and vomiting.   Endocrine: Negative for cold intolerance, heat intolerance, polydipsia and polyuria.   Genitourinary:  Negative for dysuria, flank pain, frequency, hematuria and urgency.   Musculoskeletal:  Negative for arthralgias, back pain, joint swelling, myalgias, neck pain and neck stiffness.   Skin:  Negative for rash and wound.   Allergic/Immunologic: Negative for immunocompromised state.   Neurological:  Negative for dizziness, tremors, seizures, syncope, facial asymmetry, speech difficulty, weakness, light-headedness, numbness and headaches.   Hematological:  Negative for adenopathy. Does not bruise/bleed easily.   Psychiatric/Behavioral:  Negative for agitation, behavioral problems, confusion, dysphoric mood, hallucinations, self-injury, sleep disturbance and suicidal ideas. The patient is not nervous/anxious.        Objective   /77 (BP Location: Right arm, Patient Position: Sitting, BP Cuff Size: Large adult)   Pulse 78   Temp 36.6 °C (97.9 °F) (Temporal)   Resp 16   Ht 1.651 m (5' 5\")   Wt 82.6 kg (182 lb)   SpO2 96%   BMI 30.29 kg/m²     Physical Exam  Constitutional:       General: He is not in acute distress.     Appearance: He is not ill-appearing or diaphoretic.   HENT:      Head: Normocephalic and atraumatic.      Right Ear: External ear normal.      Left Ear: External ear normal.      Nose: Nose normal. No rhinorrhea.   Eyes:      General: Lids are normal. No scleral icterus.        Right eye: No discharge.         Left eye: No discharge.      Conjunctiva/sclera: Conjunctivae normal.   Cardiovascular:      Rate and Rhythm: Normal rate and regular rhythm.      Pulses: Normal pulses.      Heart sounds: No murmur heard.  Pulmonary:      " Effort: Pulmonary effort is normal. No respiratory distress.      Breath sounds: No decreased breath sounds, wheezing, rhonchi or rales.   Abdominal:      General: Bowel sounds are normal. There is no distension.      Palpations: Abdomen is soft. There is no mass.      Tenderness: There is no abdominal tenderness. There is no guarding or rebound.   Musculoskeletal:         General: No swelling, tenderness or deformity.      Cervical back: No rigidity or tenderness.      Right lower le+ Edema present.      Left lower le+ Edema present.   Lymphadenopathy:      Cervical: No cervical adenopathy.      Upper Body:      Right upper body: No supraclavicular adenopathy.      Left upper body: No supraclavicular adenopathy.   Skin:     General: Skin is warm and dry.      Coloration: Skin is not jaundiced or pale.      Findings: No erythema, lesion or rash.      Comments: Hemosiderin deposition right anterior shin   Neurological:      General: No focal deficit present.      Mental Status: He is alert and oriented to person, place, and time.      Sensory: No sensory deficit.      Motor: No weakness or tremor.      Coordination: Coordination normal.      Gait: Gait normal.   Psychiatric:         Mood and Affect: Mood normal. Affect is not inappropriate.         Behavior: Behavior normal.         Assessment/Plan   Diagnoses and all orders for this visit:  Primary hypertension  -     Follow Up In [facility] - PCP  -     CBC and Auto Differential; Future  -     Comprehensive Metabolic Panel; Future  -     Lipid Panel; Future  -     Magnesium; Future  -     TSH with reflex to Free T4 if abnormal; Future  Mixed hyperlipidemia  -     Follow Up In [facility] - PCP  -     CBC and Auto Differential; Future  -     Comprehensive Metabolic Panel; Future  -     Lipid Panel; Future  -     Magnesium; Future  -     TSH with reflex to Free T4 if abnormal; Future  Encounter for immunization  -     Flu vaccine,  quadrivalent, high-dose, preservative free, age 65y+ (FLUZONE)  Permanent atrial fibrillation (CMS/HCC)  -     CBC and Auto Differential; Future  -     TSH with reflex to Free T4 if abnormal; Future  Class 1 obesity due to excess calories with serious comorbidity and body mass index (BMI) of 30.0 to 30.9 in adult  -     CBC and Auto Differential; Future  -     Comprehensive Metabolic Panel; Future  -     Lipid Panel; Future  -     Magnesium; Future  -     TSH with reflex to Free T4 if abnormal; Future

## 2024-01-16 DIAGNOSIS — I10 PRIMARY HYPERTENSION: ICD-10-CM

## 2024-01-17 RX ORDER — AMLODIPINE BESYLATE 2.5 MG/1
7.5 TABLET ORAL DAILY
Qty: 270 TABLET | Refills: 3 | Status: SHIPPED | OUTPATIENT
Start: 2024-01-17

## 2024-02-15 ENCOUNTER — TELEPHONE (OUTPATIENT)
Dept: CARDIOLOGY | Facility: CLINIC | Age: 84
End: 2024-02-15

## 2024-02-15 ENCOUNTER — LAB (OUTPATIENT)
Dept: LAB | Facility: LAB | Age: 84
End: 2024-02-15
Payer: MEDICARE

## 2024-02-15 DIAGNOSIS — E78.2 MIXED HYPERLIPIDEMIA: ICD-10-CM

## 2024-02-15 DIAGNOSIS — Z79.899 OTHER LONG TERM (CURRENT) DRUG THERAPY: ICD-10-CM

## 2024-02-15 DIAGNOSIS — E78.5 HYPERLIPIDEMIA, UNSPECIFIED: ICD-10-CM

## 2024-02-15 DIAGNOSIS — Z87.891 PERSONAL HISTORY OF NICOTINE DEPENDENCE: ICD-10-CM

## 2024-02-15 DIAGNOSIS — Z00.00 ENCOUNTER FOR GENERAL ADULT MEDICAL EXAMINATION WITHOUT ABNORMAL FINDINGS: Primary | ICD-10-CM

## 2024-02-15 DIAGNOSIS — I10 ESSENTIAL (PRIMARY) HYPERTENSION: ICD-10-CM

## 2024-02-15 DIAGNOSIS — I48.21 PERMANENT ATRIAL FIBRILLATION (MULTI): ICD-10-CM

## 2024-02-15 DIAGNOSIS — I10 PRIMARY HYPERTENSION: ICD-10-CM

## 2024-02-15 DIAGNOSIS — E66.09 CLASS 1 OBESITY DUE TO EXCESS CALORIES WITH SERIOUS COMORBIDITY AND BODY MASS INDEX (BMI) OF 30.0 TO 30.9 IN ADULT: ICD-10-CM

## 2024-02-15 DIAGNOSIS — E66.3 OVERWEIGHT: ICD-10-CM

## 2024-02-15 LAB
ALBUMIN SERPL BCP-MCNC: 4.2 G/DL (ref 3.4–5)
ALP SERPL-CCNC: 93 U/L (ref 33–136)
ALT SERPL W P-5'-P-CCNC: 21 U/L (ref 10–52)
ANION GAP SERPL CALC-SCNC: 12 MMOL/L (ref 10–20)
AST SERPL W P-5'-P-CCNC: 22 U/L (ref 9–39)
BASOPHILS # BLD AUTO: 0.06 X10*3/UL (ref 0–0.1)
BASOPHILS NFR BLD AUTO: 0.6 %
BILIRUB SERPL-MCNC: 1 MG/DL (ref 0–1.2)
BUN SERPL-MCNC: 14 MG/DL (ref 6–23)
CALCIUM SERPL-MCNC: 9.3 MG/DL (ref 8.6–10.3)
CHLORIDE SERPL-SCNC: 101 MMOL/L (ref 98–107)
CHOLEST SERPL-MCNC: 117 MG/DL (ref 0–199)
CHOLESTEROL/HDL RATIO: 2.5
CO2 SERPL-SCNC: 30 MMOL/L (ref 21–32)
CREAT SERPL-MCNC: 0.9 MG/DL (ref 0.5–1.3)
EGFRCR SERPLBLD CKD-EPI 2021: 86 ML/MIN/1.73M*2
EOSINOPHIL # BLD AUTO: 0.53 X10*3/UL (ref 0–0.4)
EOSINOPHIL NFR BLD AUTO: 5.7 %
ERYTHROCYTE [DISTWIDTH] IN BLOOD BY AUTOMATED COUNT: 12 % (ref 11.5–14.5)
GLUCOSE SERPL-MCNC: 104 MG/DL (ref 74–99)
HCT VFR BLD AUTO: 41.9 % (ref 41–52)
HDLC SERPL-MCNC: 47.6 MG/DL
HGB BLD-MCNC: 14.3 G/DL (ref 13.5–17.5)
IMM GRANULOCYTES # BLD AUTO: 0.05 X10*3/UL (ref 0–0.5)
IMM GRANULOCYTES NFR BLD AUTO: 0.5 % (ref 0–0.9)
LDLC SERPL CALC-MCNC: 47 MG/DL
LYMPHOCYTES # BLD AUTO: 2.78 X10*3/UL (ref 0.8–3)
LYMPHOCYTES NFR BLD AUTO: 30 %
MAGNESIUM SERPL-MCNC: 2.06 MG/DL (ref 1.6–2.4)
MCH RBC QN AUTO: 32.1 PG (ref 26–34)
MCHC RBC AUTO-ENTMCNC: 34.1 G/DL (ref 32–36)
MCV RBC AUTO: 94 FL (ref 80–100)
MONOCYTES # BLD AUTO: 0.96 X10*3/UL (ref 0.05–0.8)
MONOCYTES NFR BLD AUTO: 10.3 %
NEUTROPHILS # BLD AUTO: 4.9 X10*3/UL (ref 1.6–5.5)
NEUTROPHILS NFR BLD AUTO: 52.9 %
NON HDL CHOLESTEROL: 69 MG/DL (ref 0–149)
NRBC BLD-RTO: 0 /100 WBCS (ref 0–0)
PLATELET # BLD AUTO: 162 X10*3/UL (ref 150–450)
POTASSIUM SERPL-SCNC: 4.1 MMOL/L (ref 3.5–5.3)
PROT SERPL-MCNC: 6.9 G/DL (ref 6.4–8.2)
RBC # BLD AUTO: 4.45 X10*6/UL (ref 4.5–5.9)
SODIUM SERPL-SCNC: 139 MMOL/L (ref 136–145)
TRIGL SERPL-MCNC: 110 MG/DL (ref 0–149)
TSH SERPL-ACNC: 1.53 MIU/L (ref 0.44–3.98)
VLDL: 22 MG/DL (ref 0–40)
WBC # BLD AUTO: 9.3 X10*3/UL (ref 4.4–11.3)

## 2024-02-15 PROCEDURE — 80061 LIPID PANEL: CPT

## 2024-02-15 PROCEDURE — 84443 ASSAY THYROID STIM HORMONE: CPT

## 2024-02-15 PROCEDURE — 85025 COMPLETE CBC W/AUTO DIFF WBC: CPT

## 2024-02-15 PROCEDURE — 83735 ASSAY OF MAGNESIUM: CPT

## 2024-02-15 PROCEDURE — 80053 COMPREHEN METABOLIC PANEL: CPT

## 2024-02-15 PROCEDURE — 36415 COLL VENOUS BLD VENIPUNCTURE: CPT

## 2024-02-22 ENCOUNTER — OFFICE VISIT (OUTPATIENT)
Dept: CARDIOLOGY | Facility: CLINIC | Age: 84
End: 2024-02-22
Payer: MEDICARE

## 2024-02-22 VITALS
BODY MASS INDEX: 31.05 KG/M2 | SYSTOLIC BLOOD PRESSURE: 124 MMHG | WEIGHT: 186.6 LBS | HEART RATE: 61 BPM | DIASTOLIC BLOOD PRESSURE: 72 MMHG

## 2024-02-22 DIAGNOSIS — I50.9 CONGESTIVE HEART FAILURE, UNSPECIFIED HF CHRONICITY, UNSPECIFIED HEART FAILURE TYPE (MULTI): ICD-10-CM

## 2024-02-22 DIAGNOSIS — I25.10 ATHEROSCLEROSIS OF NATIVE CORONARY ARTERY OF NATIVE HEART WITHOUT ANGINA PECTORIS: ICD-10-CM

## 2024-02-22 DIAGNOSIS — E78.2 MIXED HYPERLIPIDEMIA: ICD-10-CM

## 2024-02-22 DIAGNOSIS — I48.21 PERMANENT ATRIAL FIBRILLATION (MULTI): ICD-10-CM

## 2024-02-22 DIAGNOSIS — I87.2 VENOUS INSUFFICIENCY: ICD-10-CM

## 2024-02-22 DIAGNOSIS — I10 PRIMARY HYPERTENSION: ICD-10-CM

## 2024-02-22 PROCEDURE — 3078F DIAST BP <80 MM HG: CPT | Performed by: INTERNAL MEDICINE

## 2024-02-22 PROCEDURE — 99214 OFFICE O/P EST MOD 30 MIN: CPT | Performed by: INTERNAL MEDICINE

## 2024-02-22 PROCEDURE — 1159F MED LIST DOCD IN RCRD: CPT | Performed by: INTERNAL MEDICINE

## 2024-02-22 PROCEDURE — 1036F TOBACCO NON-USER: CPT | Performed by: INTERNAL MEDICINE

## 2024-02-22 PROCEDURE — 3074F SYST BP LT 130 MM HG: CPT | Performed by: INTERNAL MEDICINE

## 2024-02-22 RX ORDER — METOPROLOL SUCCINATE 100 MG/1
100 TABLET, EXTENDED RELEASE ORAL DAILY
Qty: 90 TABLET | Refills: 1 | Status: SHIPPED | OUTPATIENT
Start: 2024-02-22

## 2024-02-22 RX ORDER — FUROSEMIDE 20 MG/1
20 TABLET ORAL
Qty: 36 TABLET | Refills: 1 | Status: SHIPPED | OUTPATIENT
Start: 2024-02-23 | End: 2024-06-04

## 2024-02-22 RX ORDER — TELMISARTAN 80 MG/1
80 TABLET ORAL DAILY
Qty: 90 TABLET | Refills: 1 | Status: SHIPPED | OUTPATIENT
Start: 2024-02-22

## 2024-02-22 RX ORDER — POTASSIUM CHLORIDE 750 MG/1
10 TABLET, FILM COATED, EXTENDED RELEASE ORAL
Qty: 36 TABLET | Refills: 1 | Status: SHIPPED | OUTPATIENT
Start: 2024-02-23 | End: 2024-06-04

## 2024-02-22 RX ORDER — ATORVASTATIN CALCIUM 40 MG/1
40 TABLET, FILM COATED ORAL NIGHTLY
Qty: 90 TABLET | Refills: 1 | Status: SHIPPED | OUTPATIENT
Start: 2024-02-22

## 2024-02-22 NOTE — PROGRESS NOTES
Patient:  Curt Giang  YOB: 1940  MRN: [id number]       HPI:       Curt Giang is a 83 y.o. male who returns today for cardiac follow-up.  He has permanent atrial fibrillation managed with a rate control strategy. He is on Toprol- mg daily and is anticoagulated with Eliquis. He previously opted against pharmacologic or electrical cardioversion. He has atherosclerotic heart disease with previous non-ST segment elevation myocardial infarction in 2001 at which time he underwent angioplasty and stenting of the circumflex. He has essential hypertension and hyperlipidemia.  PVR August 29, 2022 was normal.     Curt continues to do well since his last visit. He denies any chest pain or shortness of breath. He denies any orthopnea, PND, or increasing peripheral edema. He denies any palpitations, lightheadedness, near-syncope, or syncope. He denies any fever, chills, or cough. He denies any nausea, vomiting, or diaphoresis. He denies any hemoptysis, hematemesis, melena, or hematochezia. Lab studies February 15, 2024 showed a normal CBC and CMP.  Cholesterol 117 with HDL 48, LDL 47, and triglycerides 110.  Magnesium was 2.06.  TSH was normal.  His blood pressures are well-controlled.  He will continue his same medications.  Other details are as noted below.      The above portion of this note was dictated by me using voice recognition software. I personally performed the services described in the documentation. The scribe entering the documentation below was in my presence. I affirm that the information is both accurate and complete.        Objective:     Vitals:    02/22/24 1041   BP: 124/72   Pulse: 61       Wt Readings from Last 4 Encounters:   02/22/24 84.6 kg (186 lb 9.6 oz)   09/18/23 82.6 kg (182 lb)   08/24/23 83 kg (183 lb)   03/17/23 85.5 kg (188 lb 9.6 oz)       Allergies:     Allergies   Allergen Reactions    Nisoldipine Itching and Swelling    Simvastatin Other     Stopped due to  a potential interaction with Amlodipine    Carvedilol Rash    Clonidine Rash    Dabigatran Etexilate Rash    Hydrochlorothiazide Rash    Losartan Rash    Nitroglycerin Rash    Rivaroxaban Rash        Medications:     Current Outpatient Medications   Medication Instructions    amLODIPine (NORVASC) 7.5 mg, oral, Daily    aspirin 81 mg EC tablet 1 tablet, oral, Daily    atorvastatin (Lipitor) 40 mg tablet 1 tablet, oral, Nightly    docosahexaenoic acid/vit C/lut (EYE HEALTH ORAL) Take 2 tabs in the morning and 2 tabs in the evening    Eliquis 5 mg, oral, 2 times daily    furosemide (LASIX) 20 mg, oral, Every Mon/Wed/Fri    metoprolol succinate XL (TOPROL-XL) 100 mg, oral, Daily    multivitamin,tx-minerals (Vitamins and Minerals) tablet 1 tablet, oral, Daily, Black Cherry 1000 mg    nitroglycerin (Nitrostat) 0.4 mg SL tablet Place 1 tab under the tongue every 5 minutes for up to 3 doses as needed for chest pain. Call 911 if pain persists    potassium chloride CR 10 mEq ER tablet 10 mEq, oral, Every Mon/Wed/Fri    telmisartan (MICARDIS) 80 mg, oral, Daily       Physical Examination:   GENERAL:  Well developed, well nourished, in no acute distress.  CHEST:  Symmetric and nontender.  NEURO/PSYCH:  Alert and oriented times three with approppriate behavior and responses.  NECK:  Supple, no JVD, no bruit.  LUNGS:  Clear to auscultation bilaterally, normal respiratory effort.  HEART:  Rate and rhythm regular with no evident murmur, no gallop appreciated.        There are no rubs, clicks or heaves.  EXTREMITIES:  Warm with good color, no clubbing or cyanosis.  There is no edema noted.  PERIPHERAL VASCULAR:  Pulses present and equally palpable; 2+ throughout.      Lab:     CBC:   Lab Results   Component Value Date    WBC 9.3 02/15/2024    RBC 4.45 (L) 02/15/2024    HGB 14.3 02/15/2024    HCT 41.9 02/15/2024     02/15/2024        CMP:    Lab Results   Component Value Date     02/15/2024    K 4.1 02/15/2024      02/15/2024    CO2 30 02/15/2024    BUN 14 02/15/2024    CREATININE 0.90 02/15/2024    GLUCOSE 104 (H) 02/15/2024    CALCIUM 9.3 02/15/2024       Magnesium:    Lab Results   Component Value Date    MG 2.06 02/15/2024       Lipid Profile:    Lab Results   Component Value Date    TRIG 110 02/15/2024    HDL 47.6 02/15/2024    LDLCALC 47 02/15/2024       TSH:    Lab Results   Component Value Date    TSH 1.53 02/15/2024       BMP:  Lab Results   Component Value Date     02/15/2024     09/15/2023     06/18/2020     (L) 09/24/2019    K 4.1 02/15/2024    K 4.2 09/15/2023    K 4.0 06/18/2020    K 4.0 09/24/2019     02/15/2024     09/15/2023    CL 98 06/18/2020    CL 99 09/24/2019    CO2 30 02/15/2024    CO2 27 09/15/2023    CO2 31 06/18/2020    CO2 29 09/24/2019    BUN 14 02/15/2024    BUN 13 09/15/2023    BUN 15 06/18/2020    BUN 12 09/24/2019    CREATININE 0.90 02/15/2024    CREATININE 0.88 09/15/2023    CREATININE 0.93 06/18/2020    CREATININE 0.85 09/24/2019       CBC:  Lab Results   Component Value Date    WBC 9.3 02/15/2024    WBC 8.3 09/15/2023    WBC 7.9 09/24/2019    RBC 4.45 (L) 02/15/2024    RBC 4.50 09/15/2023    RBC 4.31 (L) 09/24/2019    HGB 14.3 02/15/2024    HGB 14.3 09/15/2023    HGB 13.7 09/24/2019    HCT 41.9 02/15/2024    HCT 42.3 09/15/2023    HCT 41.2 09/24/2019    MCV 94 02/15/2024    MCV 94 09/15/2023    MCV 96 09/24/2019    MCH 32.1 02/15/2024    MCHC 34.1 02/15/2024    MCHC 33.8 09/15/2023    MCHC 33.3 09/24/2019    RDW 12.0 02/15/2024    RDW 12.0 09/15/2023    RDW 12.3 09/24/2019     02/15/2024     (L) 09/15/2023     (L) 09/24/2019       Hepatic Function Panel:    Lab Results   Component Value Date    ALKPHOS 93 02/15/2024    ALT 21 02/15/2024    AST 22 02/15/2024    PROT 6.9 02/15/2024    BILITOT 1.0 02/15/2024       Problem List:     Patient Active Problem List   Diagnosis    Atherosclerosis of native coronary artery of native heart without  angina pectoris    Primary hypertension    Mixed hyperlipidemia    Permanent atrial fibrillation (CMS/[facility])    S/P PTCA (percutaneous transluminal coronary angioplasty)    Venous insufficiency    Class 1 obesity due to excess calories with serious comorbidity and body mass index (BMI) of 30.0 to 30.9 in adult       Asessment:     Problem List Items Addressed This Visit             ICD-10-CM    Atherosclerosis of native coronary artery of native heart without angina pectoris I25.10    Relevant Medications    metoprolol succinate XL (Toprol-XL) 100 mg 24 hr tablet    Other Relevant Orders    Follow Up In Cardiology    Primary hypertension I10    Relevant Medications    furosemide (Lasix) 20 mg tablet (Start on 2/23/2024)    telmisartan (MIcarDIS) 80 mg tablet    Other Relevant Orders    CBC    Comprehensive metabolic panel    Lipid panel    Follow Up In Cardiology    Mixed hyperlipidemia E78.2    Relevant Medications    atorvastatin (Lipitor) 40 mg tablet    Other Relevant Orders    CBC    Comprehensive metabolic panel    Lipid panel    Follow Up In Cardiology    Permanent atrial fibrillation (CMS/[facility]) I48.21    Relevant Medications    apixaban (Eliquis) 5 mg tablet    metoprolol succinate XL (Toprol-XL) 100 mg 24 hr tablet    Other Relevant Orders    CBC    Comprehensive metabolic panel    Lipid panel    Follow Up In Cardiology    Venous insufficiency I87.2    Relevant Medications    furosemide (Lasix) 20 mg tablet (Start on 2/23/2024)    telmisartan (MIcarDIS) 80 mg tablet    Other Relevant Orders    CBC    Comprehensive metabolic panel    Lipid panel    Follow Up In Cardiology     Other Visit Diagnoses         Codes    Congestive heart failure, unspecified HF chronicity, unspecified heart failure type (CMS/[facility])     I50.9    Relevant Medications    metoprolol succinate XL (Toprol-XL) 100 mg 24 hr tablet    potassium chloride CR 10 mEq ER tablet (Start on 2/23/2024)    Other Relevant Orders    CBC    Comprehensive  metabolic panel    Lipid panel    Follow Up In Cardiology

## 2024-03-07 PROBLEM — I25.2 HISTORY OF MYOCARDIAL INFARCTION: Status: ACTIVE | Noted: 2023-03-11

## 2024-03-07 RX ORDER — FLUORIDE (SODIUM) 1.1 %
PASTE (ML) DENTAL
COMMUNITY
Start: 2023-11-30

## 2024-03-11 NOTE — PROGRESS NOTES
----- Message from Hesham Andrews MD sent at 3/11/2024  2:37 PM CDT -----  Please notify the patient of results.  Thank you      Normal ABIs. Great news, this implies vasculature is normal.    Subjective   Patient ID: Curt Giang is a 82 y.o. male who presents for [facility] and Medicare Annual Wellness Visit Initial.    Patient c/o occasional foot pain in both feet.     Hypertension  Pertinent negatives include no chest pain, headaches, neck pain, palpitations or shortness of breath.   Hyperlipidemia  Pertinent negatives include no chest pain, myalgias or shortness of breath.        Review of Systems   Constitutional:  Negative for activity change, appetite change, chills, diaphoresis, fatigue, fever and unexpected weight change.   HENT:  Negative for congestion, dental problem, drooling, ear discharge, ear pain, facial swelling, hearing loss, mouth sores, nosebleeds, postnasal drip, rhinorrhea, sinus pressure, sinus pain, sneezing, sore throat, tinnitus, trouble swallowing and voice change.    Eyes:  Negative for photophobia, pain, discharge, redness, itching and visual disturbance.   Respiratory:  Negative for cough, choking, chest tightness, shortness of breath and wheezing.    Cardiovascular:  Negative for chest pain, palpitations and leg swelling.   Gastrointestinal:  Negative for abdominal distention, abdominal pain, blood in stool, constipation, diarrhea, nausea and vomiting.   Endocrine: Negative for cold intolerance, heat intolerance, polydipsia and polyuria.   Genitourinary:  Negative for dysuria, flank pain, frequency, hematuria and urgency.   Musculoskeletal:  Negative for arthralgias, back pain, joint swelling, myalgias, neck pain and neck stiffness.   Skin:  Negative for rash and wound.   Allergic/Immunologic: Negative for immunocompromised state.   Neurological:  Negative for dizziness, tremors, seizures, syncope, facial asymmetry, speech difficulty, weakness, light-headedness, numbness and headaches.   Hematological:  Negative for adenopathy. Does not bruise/bleed easily.   Psychiatric/Behavioral:  Negative for agitation, behavioral problems, confusion, dysphoric mood,  "hallucinations, self-injury, sleep disturbance and suicidal ideas. The patient is not nervous/anxious.        Objective   /73   Pulse 67   Temp 36.8 °C (98.3 °F)   Resp 16   Ht 1.651 m (5' 5\")   Wt 85.5 kg (188 lb 9.6 oz)   SpO2 98%   BMI 31.38 kg/m²     Physical Exam  Constitutional:       General: He is not in acute distress.     Appearance: He is not ill-appearing or diaphoretic.   HENT:      Head: Normocephalic and atraumatic.      Right Ear: External ear normal.      Left Ear: External ear normal.      Nose: Nose normal. No rhinorrhea.   Eyes:      General: Lids are normal. No scleral icterus.        Right eye: No discharge.         Left eye: No discharge.      Conjunctiva/sclera: Conjunctivae normal.   Cardiovascular:      Rate and Rhythm: Normal rate and regular rhythm.      Pulses: Normal pulses.      Heart sounds: No murmur heard.  Pulmonary:      Effort: Pulmonary effort is normal. No respiratory distress.      Breath sounds: No decreased breath sounds, wheezing, rhonchi or rales.   Abdominal:      General: Bowel sounds are normal. There is no distension.      Palpations: Abdomen is soft. There is no mass.      Tenderness: There is no abdominal tenderness. There is no guarding or rebound.   Musculoskeletal:         General: No swelling, tenderness or deformity.      Cervical back: No rigidity or tenderness.      Right lower leg: No edema.      Left lower leg: No edema.   Lymphadenopathy:      Cervical: No cervical adenopathy.      Upper Body:      Right upper body: No supraclavicular adenopathy.      Left upper body: No supraclavicular adenopathy.   Skin:     General: Skin is warm and dry.      Coloration: Skin is not jaundiced or pale.      Findings: No erythema, lesion or rash.   Neurological:      General: No focal deficit present.      Mental Status: He is alert and oriented to person, place, and time.      Sensory: No sensory deficit.      Motor: No weakness or tremor.      Coordination: " Coordination normal.      Gait: Gait normal.   Psychiatric:         Mood and Affect: Mood normal. Affect is not inappropriate.         Behavior: Behavior normal.       Assessment/Plan   Diagnoses and all orders for this visit:  Routine general medical examination at a health care facility  -     Follow Up In [facility] - PCP; Future  Primary hypertension  -     amLODIPine (Norvasc) 2.5 mg tablet; Take 3 tablets (7.5 mg) by mouth once daily.  -     CBC and Auto Differential; Future  -     Comprehensive Metabolic Panel; Future  -     Lipid Panel; Future  -     Magnesium; Future  -     TSH with reflex to Free T4 if abnormal; Future  -     Follow Up In [facility] - PCP; Future  Mixed hyperlipidemia  -     CBC and Auto Differential; Future  -     Comprehensive Metabolic Panel; Future  -     Lipid Panel; Future  -     Magnesium; Future  -     TSH with reflex to Free T4 if abnormal; Future  -     Follow Up In [facility] - PCP; Future  Class 1 obesity due to excess calories with serious comorbidity and body mass index (BMI) of 31.0 to 31.9 in adult  -     CBC and Auto Differential; Future  -     Comprehensive Metabolic Panel; Future  -     Lipid Panel; Future  -     Magnesium; Future  -     TSH with reflex to Free T4 if abnormal; Future  -     Follow Up In [facility] - PCP; Future

## 2024-03-18 ENCOUNTER — OFFICE VISIT (OUTPATIENT)
Dept: PRIMARY CARE | Facility: CLINIC | Age: 84
End: 2024-03-18
Payer: MEDICARE

## 2024-03-18 VITALS
HEART RATE: 74 BPM | BODY MASS INDEX: 31.39 KG/M2 | DIASTOLIC BLOOD PRESSURE: 63 MMHG | TEMPERATURE: 97.7 F | RESPIRATION RATE: 16 BRPM | OXYGEN SATURATION: 97 % | SYSTOLIC BLOOD PRESSURE: 120 MMHG | HEIGHT: 65 IN | WEIGHT: 188.4 LBS

## 2024-03-18 DIAGNOSIS — C44.41 BASAL CELL CARCINOMA (BCC) OF SCALP: ICD-10-CM

## 2024-03-18 DIAGNOSIS — Z71.89 ADVANCED DIRECTIVES, COUNSELING/DISCUSSION: ICD-10-CM

## 2024-03-18 DIAGNOSIS — Z00.00 ROUTINE GENERAL MEDICAL EXAMINATION AT HEALTH CARE FACILITY: Primary | ICD-10-CM

## 2024-03-18 DIAGNOSIS — Z23 ENCOUNTER FOR IMMUNIZATION: ICD-10-CM

## 2024-03-18 DIAGNOSIS — E78.2 MIXED HYPERLIPIDEMIA: ICD-10-CM

## 2024-03-18 DIAGNOSIS — I10 PRIMARY HYPERTENSION: ICD-10-CM

## 2024-03-18 DIAGNOSIS — E66.09 CLASS 1 OBESITY DUE TO EXCESS CALORIES WITH SERIOUS COMORBIDITY AND BODY MASS INDEX (BMI) OF 31.0 TO 31.9 IN ADULT: ICD-10-CM

## 2024-03-18 PROCEDURE — 1170F FXNL STATUS ASSESSED: CPT | Performed by: FAMILY MEDICINE

## 2024-03-18 PROCEDURE — 3074F SYST BP LT 130 MM HG: CPT | Performed by: FAMILY MEDICINE

## 2024-03-18 PROCEDURE — 3078F DIAST BP <80 MM HG: CPT | Performed by: FAMILY MEDICINE

## 2024-03-18 PROCEDURE — 1159F MED LIST DOCD IN RCRD: CPT | Performed by: FAMILY MEDICINE

## 2024-03-18 PROCEDURE — 99214 OFFICE O/P EST MOD 30 MIN: CPT | Performed by: FAMILY MEDICINE

## 2024-03-18 PROCEDURE — 1123F ACP DISCUSS/DSCN MKR DOCD: CPT | Performed by: FAMILY MEDICINE

## 2024-03-18 PROCEDURE — 1036F TOBACCO NON-USER: CPT | Performed by: FAMILY MEDICINE

## 2024-03-18 PROCEDURE — G0439 PPPS, SUBSEQ VISIT: HCPCS | Performed by: FAMILY MEDICINE

## 2024-03-18 PROCEDURE — 1158F ADVNC CARE PLAN TLK DOCD: CPT | Performed by: FAMILY MEDICINE

## 2024-03-18 PROCEDURE — 99497 ADVNCD CARE PLAN 30 MIN: CPT | Performed by: FAMILY MEDICINE

## 2024-03-18 ASSESSMENT — ENCOUNTER SYMPTOMS
CHILLS: 0
EYE ITCHING: 0
SHORTNESS OF BREATH: 0
DEPRESSION: 0
ARTHRALGIAS: 0
SINUS PRESSURE: 0
COUGH: 0
WOUND: 0
FATIGUE: 0
FACIAL SWELLING: 0
DIAPHORESIS: 0
VOICE CHANGE: 0
TROUBLE SWALLOWING: 0
CHOKING: 0
CONFUSION: 0
LIGHT-HEADEDNESS: 0
SINUS PAIN: 0
VOMITING: 0
MYALGIAS: 0
DYSURIA: 0
AGITATION: 0
PHOTOPHOBIA: 0
ADENOPATHY: 0
SORE THROAT: 0
HALLUCINATIONS: 0
HEMATURIA: 0
HYPERTENSION: 1
OCCASIONAL FEELINGS OF UNSTEADINESS: 0
JOINT SWELLING: 0
EYE PAIN: 0
CONSTIPATION: 0
POLYDIPSIA: 0
FEVER: 0
EYE DISCHARGE: 0
DIZZINESS: 0
CHEST TIGHTNESS: 0
RHINORRHEA: 0
UNEXPECTED WEIGHT CHANGE: 0
NECK STIFFNESS: 0
PALPITATIONS: 0
BRUISES/BLEEDS EASILY: 0
NAUSEA: 0
WHEEZING: 0
SLEEP DISTURBANCE: 0
ABDOMINAL PAIN: 0
HEADACHES: 0
WEAKNESS: 0
LOSS OF SENSATION IN FEET: 0
SPEECH DIFFICULTY: 0
DIARRHEA: 0
APPETITE CHANGE: 0
NECK PAIN: 0
FREQUENCY: 0
EYE REDNESS: 0
ACTIVITY CHANGE: 0
BLOOD IN STOOL: 0
NUMBNESS: 0
DYSPHORIC MOOD: 0
FLANK PAIN: 0
BACK PAIN: 0
FACIAL ASYMMETRY: 0
ABDOMINAL DISTENTION: 0
TREMORS: 0
SEIZURES: 0
NERVOUS/ANXIOUS: 0

## 2024-03-18 ASSESSMENT — ACTIVITIES OF DAILY LIVING (ADL)
DOING_HOUSEWORK: INDEPENDENT
MANAGING_FINANCES: INDEPENDENT
TAKING_MEDICATION: INDEPENDENT
BATHING: INDEPENDENT
GROCERY_SHOPPING: INDEPENDENT
DRESSING: INDEPENDENT

## 2024-03-18 ASSESSMENT — PATIENT HEALTH QUESTIONNAIRE - PHQ9
2. FEELING DOWN, DEPRESSED OR HOPELESS: NOT AT ALL
1. LITTLE INTEREST OR PLEASURE IN DOING THINGS: NOT AT ALL
SUM OF ALL RESPONSES TO PHQ9 QUESTIONS 1 AND 2: 0

## 2024-03-18 NOTE — PROGRESS NOTES
Subjective   Patient ID: MARISELA Giang is a 83 y.o. male who presents for Medicare Annual Wellness Visit Subsequent (Review labs) and Mass (Bump on lower left side of scalp. Has been there for 2 months wont go away. ).      Hypertension  This is a chronic problem. The current episode started more than 1 year ago. The problem is unchanged. The problem is controlled. Associated symptoms include peripheral edema. Pertinent negatives include no chest pain, headaches, neck pain, palpitations or shortness of breath. There are no associated agents to hypertension. Risk factors for coronary artery disease include dyslipidemia, male gender and obesity. Past treatments include diuretics, calcium channel blockers, beta blockers and angiotensin blockers. The current treatment provides significant improvement. There are no compliance problems.  Hypertensive end-organ damage includes CAD/MI. There is no history of a hypertension causing med or a thyroid problem.   Hyperlipidemia  This is a chronic problem. The current episode started more than 1 year ago. The problem is controlled. Recent lipid tests were reviewed and are normal. Factors aggravating his hyperlipidemia include beta blockers. Pertinent negatives include no chest pain, myalgias or shortness of breath. Current antihyperlipidemic treatment includes statins. The current treatment provides significant improvement of lipids. There are no compliance problems.         Review of Systems   Constitutional:  Negative for activity change, appetite change, chills, diaphoresis, fatigue, fever and unexpected weight change.   HENT:  Negative for congestion, dental problem, drooling, ear discharge, ear pain, facial swelling, hearing loss, mouth sores, nosebleeds, postnasal drip, rhinorrhea, sinus pressure, sinus pain, sneezing, sore throat, tinnitus, trouble swallowing and voice change.    Eyes:  Negative for photophobia, pain, discharge, redness, itching and visual disturbance.  "  Respiratory:  Negative for cough, choking, chest tightness, shortness of breath and wheezing.    Cardiovascular:  Positive for leg swelling. Negative for chest pain and palpitations.   Gastrointestinal:  Negative for abdominal distention, abdominal pain, blood in stool, constipation, diarrhea, nausea and vomiting.   Endocrine: Negative for cold intolerance, heat intolerance, polydipsia and polyuria.   Genitourinary:  Negative for dysuria, flank pain, frequency, hematuria and urgency.   Musculoskeletal:  Negative for arthralgias, back pain, joint swelling, myalgias, neck pain and neck stiffness.   Skin:  Negative for rash and wound.   Allergic/Immunologic: Negative for immunocompromised state.   Neurological:  Negative for dizziness, tremors, seizures, syncope, facial asymmetry, speech difficulty, weakness, light-headedness, numbness and headaches.   Hematological:  Negative for adenopathy. Does not bruise/bleed easily.   Psychiatric/Behavioral:  Negative for agitation, behavioral problems, confusion, dysphoric mood, hallucinations, self-injury, sleep disturbance and suicidal ideas. The patient is not nervous/anxious.        Objective   /63 (BP Location: Right arm, Patient Position: Sitting, BP Cuff Size: Large adult)   Pulse 74   Temp 36.5 °C (97.7 °F) (Temporal)   Resp 16   Ht 1.651 m (5' 5\")   Wt 85.5 kg (188 lb 6.4 oz)   SpO2 97%   BMI 31.35 kg/m²     Physical Exam  Constitutional:       General: He is not in acute distress.     Appearance: He is not ill-appearing or diaphoretic.   HENT:      Head: Normocephalic and atraumatic.      Right Ear: External ear normal.      Left Ear: External ear normal.      Nose: Nose normal. No rhinorrhea.   Eyes:      General: Lids are normal. No scleral icterus.        Right eye: No discharge.         Left eye: No discharge.      Conjunctiva/sclera: Conjunctivae normal.   Cardiovascular:      Rate and Rhythm: Normal rate. Rhythm irregularly irregular.      Pulses: " Normal pulses.      Heart sounds: No murmur heard.  Pulmonary:      Effort: Pulmonary effort is normal. No respiratory distress.      Breath sounds: No decreased breath sounds, wheezing, rhonchi or rales.   Abdominal:      General: Bowel sounds are normal. There is no distension.      Palpations: Abdomen is soft. There is no mass.      Tenderness: There is no abdominal tenderness. There is no guarding or rebound.   Musculoskeletal:         General: No swelling, tenderness or deformity.      Cervical back: No rigidity or tenderness.      Right lower le+ Edema present.      Left lower le+ Edema present.   Lymphadenopathy:      Cervical: No cervical adenopathy.      Upper Body:      Right upper body: No supraclavicular adenopathy.      Left upper body: No supraclavicular adenopathy.   Skin:     General: Skin is warm and dry.      Coloration: Skin is not jaundiced or pale.      Findings: No erythema, lesion or rash.      Comments: Hemosiderin deposition right anterior shin   Neurological:      General: No focal deficit present.      Mental Status: He is alert and oriented to person, place, and time.      Sensory: No sensory deficit.      Motor: No weakness or tremor.      Coordination: Coordination normal.      Gait: Gait normal.   Psychiatric:         Mood and Affect: Mood normal. Affect is not inappropriate.         Behavior: Behavior normal.         Assessment/Plan   Diagnoses and all orders for this visit:  Routine general medical examination at health care facility  -     CBC and Auto Differential; Future  -     Comprehensive Metabolic Panel; Future  -     Lipid Panel; Future  -     Magnesium; Future  -     TSH with reflex to Free T4 if abnormal; Future  -     Follow Up In [facility] - [facility] - Medicare Annual; Future  Basal cell carcinoma (BCC) of scalp  -     Referral to Dermatology  -     Follow Up In [facility] - [facility] - Medicare Annual; Future  Encounter for immunization  -     zoster  vaccine-recombinant adjuvanted (Shingrix) 50 mcg/0.5 mL vaccine; Inject 0.5 mL into the muscle 1 time for 1 dose.  -     diphth,pertus,acell,,tetanus (BoostRIX) 2.5-8-5 Lf-mcg-Lf/0.5mL injection; Inject 0.5 mL into the muscle 1 time for 1 dose.  -     Follow Up In [facility] - PCP - Medicare Annual; Future  Advanced directives, counseling/discussion  -     Full code  -     Follow Up In [facility] - PCP - Medicare Annual; Future  Mixed hyperlipidemia  -     CBC and Auto Differential; Future  -     Comprehensive Metabolic Panel; Future  -     Lipid Panel; Future  -     Magnesium; Future  -     TSH with reflex to Free T4 if abnormal; Future  -     Follow Up In [facility] - PCP - Medicare Annual; Future  Primary hypertension  -     CBC and Auto Differential; Future  -     Comprehensive Metabolic Panel; Future  -     Lipid Panel; Future  -     Magnesium; Future  -     TSH with reflex to Free T4 if abnormal; Future  -     Follow Up In [facility] - PCP - Medicare Annual; Future  Class 1 obesity due to excess calories with serious comorbidity and body mass index (BMI) of 31.0 to 31.9 in adult  -     CBC and Auto Differential; Future  -     Comprehensive Metabolic Panel; Future  -     Lipid Panel; Future  -     Magnesium; Future  -     TSH with reflex to Free T4 if abnormal; Future  -     Follow Up In [facility] - PCP - Medicare Annual; Future  Advance care planning was discussed including living will, power of  for healthcare and living will.  Advance care planning packet will be provided to patient at discharge.  Patient was encouraged to bring in any advanced care planning paperwork to file on the chart at their own convenience.  (~16min spent discussing above)  Patient was seen and evaluated with student. I was present for critical portion of history and physical exam. I reviewed note with student and agree with findings as written  NATASHA DO

## 2024-03-18 NOTE — PROGRESS NOTES
"Subjective   Patient ID: MARISELA Giang is a 83 y.o. male who presents for Medicare Annual Wellness Visit Subsequent (Review labs) and Mass (Bump on lower left side of scalp. Has been there for 2 months wont go away. ).    3 month history of supra-auricular skin lesions on L side. 2 <0.5cm lesions with central darkness and granulation tissue. No associated pruritus or pain. Hx of R-sided nasal lesion resection. This lesion indicates possible resection with most likely etiology scab from mechanical irritation 2/2 to hat wearing vs precancerous skin lesion (likely BCC).         Review of Systems    Objective   /63 (BP Location: Right arm, Patient Position: Sitting, BP Cuff Size: Large adult)   Pulse 74   Temp 36.5 °C (97.7 °F) (Temporal)   Resp 16   Ht 1.651 m (5' 5\")   Wt 85.5 kg (188 lb 6.4 oz)   SpO2 97%   BMI 31.35 kg/m²     Physical Exam  Skin:     Comments: 2 separate lesions supra auricular. Dark granulation tissue/eschar, both <0.5 cm diameter.  No other similar lesions noted on scalp/head         Assessment/Plan          "

## 2024-08-08 ENCOUNTER — LAB (OUTPATIENT)
Dept: LAB | Facility: LAB | Age: 84
End: 2024-08-08
Payer: MEDICARE

## 2024-08-08 ENCOUNTER — APPOINTMENT (OUTPATIENT)
Dept: CARDIOLOGY | Facility: CLINIC | Age: 84
End: 2024-08-08
Payer: MEDICARE

## 2024-08-08 VITALS
HEIGHT: 65 IN | BODY MASS INDEX: 29.72 KG/M2 | HEART RATE: 62 BPM | DIASTOLIC BLOOD PRESSURE: 68 MMHG | WEIGHT: 178.4 LBS | SYSTOLIC BLOOD PRESSURE: 112 MMHG

## 2024-08-08 DIAGNOSIS — E78.2 MIXED HYPERLIPIDEMIA: ICD-10-CM

## 2024-08-08 DIAGNOSIS — I10 PRIMARY HYPERTENSION: ICD-10-CM

## 2024-08-08 DIAGNOSIS — I48.21 PERMANENT ATRIAL FIBRILLATION (MULTI): ICD-10-CM

## 2024-08-08 DIAGNOSIS — I50.9 CONGESTIVE HEART FAILURE, UNSPECIFIED HF CHRONICITY, UNSPECIFIED HEART FAILURE TYPE (MULTI): ICD-10-CM

## 2024-08-08 DIAGNOSIS — I87.2 VENOUS INSUFFICIENCY: ICD-10-CM

## 2024-08-08 DIAGNOSIS — I25.10 ATHEROSCLEROSIS OF NATIVE CORONARY ARTERY OF NATIVE HEART WITHOUT ANGINA PECTORIS: ICD-10-CM

## 2024-08-08 LAB
ALBUMIN SERPL BCP-MCNC: 4.1 G/DL (ref 3.4–5)
ALP SERPL-CCNC: 111 U/L (ref 33–136)
ALT SERPL W P-5'-P-CCNC: 21 U/L (ref 10–52)
ANION GAP SERPL CALC-SCNC: 11 MMOL/L (ref 10–20)
AST SERPL W P-5'-P-CCNC: 21 U/L (ref 9–39)
BILIRUB SERPL-MCNC: 1.2 MG/DL (ref 0–1.2)
BUN SERPL-MCNC: 16 MG/DL (ref 6–23)
CALCIUM SERPL-MCNC: 9 MG/DL (ref 8.6–10.3)
CHLORIDE SERPL-SCNC: 102 MMOL/L (ref 98–107)
CHOLEST SERPL-MCNC: 114 MG/DL (ref 0–199)
CHOLESTEROL/HDL RATIO: 2.4
CO2 SERPL-SCNC: 29 MMOL/L (ref 21–32)
CREAT SERPL-MCNC: 0.85 MG/DL (ref 0.5–1.3)
EGFRCR SERPLBLD CKD-EPI 2021: 87 ML/MIN/1.73M*2
ERYTHROCYTE [DISTWIDTH] IN BLOOD BY AUTOMATED COUNT: 12.4 % (ref 11.5–14.5)
GLUCOSE SERPL-MCNC: 96 MG/DL (ref 74–99)
HCT VFR BLD AUTO: 40.1 % (ref 41–52)
HDLC SERPL-MCNC: 46.6 MG/DL
HGB BLD-MCNC: 13.6 G/DL (ref 13.5–17.5)
LDLC SERPL CALC-MCNC: 47 MG/DL
MCH RBC QN AUTO: 32.3 PG (ref 26–34)
MCHC RBC AUTO-ENTMCNC: 33.9 G/DL (ref 32–36)
MCV RBC AUTO: 95 FL (ref 80–100)
NON HDL CHOLESTEROL: 67 MG/DL (ref 0–149)
NRBC BLD-RTO: 0 /100 WBCS (ref 0–0)
PLATELET # BLD AUTO: 164 X10*3/UL (ref 150–450)
POTASSIUM SERPL-SCNC: 4.3 MMOL/L (ref 3.5–5.3)
PROT SERPL-MCNC: 7.1 G/DL (ref 6.4–8.2)
RBC # BLD AUTO: 4.21 X10*6/UL (ref 4.5–5.9)
SODIUM SERPL-SCNC: 138 MMOL/L (ref 136–145)
TRIGL SERPL-MCNC: 100 MG/DL (ref 0–149)
VLDL: 20 MG/DL (ref 0–40)
WBC # BLD AUTO: 8.7 X10*3/UL (ref 4.4–11.3)

## 2024-08-08 PROCEDURE — 1036F TOBACCO NON-USER: CPT | Performed by: INTERNAL MEDICINE

## 2024-08-08 PROCEDURE — 85027 COMPLETE CBC AUTOMATED: CPT

## 2024-08-08 PROCEDURE — 3078F DIAST BP <80 MM HG: CPT | Performed by: INTERNAL MEDICINE

## 2024-08-08 PROCEDURE — 1123F ACP DISCUSS/DSCN MKR DOCD: CPT | Performed by: INTERNAL MEDICINE

## 2024-08-08 PROCEDURE — 3074F SYST BP LT 130 MM HG: CPT | Performed by: INTERNAL MEDICINE

## 2024-08-08 PROCEDURE — 1159F MED LIST DOCD IN RCRD: CPT | Performed by: INTERNAL MEDICINE

## 2024-08-08 PROCEDURE — 80053 COMPREHEN METABOLIC PANEL: CPT

## 2024-08-08 PROCEDURE — 36415 COLL VENOUS BLD VENIPUNCTURE: CPT

## 2024-08-08 PROCEDURE — 80061 LIPID PANEL: CPT

## 2024-08-08 PROCEDURE — 99214 OFFICE O/P EST MOD 30 MIN: CPT | Performed by: INTERNAL MEDICINE

## 2024-08-08 RX ORDER — METOPROLOL SUCCINATE 100 MG/1
100 TABLET, EXTENDED RELEASE ORAL DAILY
Qty: 90 TABLET | Refills: 1 | Status: SHIPPED | OUTPATIENT
Start: 2024-08-08

## 2024-08-08 RX ORDER — POTASSIUM CHLORIDE 750 MG/1
10 TABLET, FILM COATED, EXTENDED RELEASE ORAL 3 TIMES WEEKLY
Qty: 45 TABLET | Refills: 1 | Status: SHIPPED | OUTPATIENT
Start: 2024-08-09 | End: 2025-08-09

## 2024-08-08 RX ORDER — FUROSEMIDE 20 MG/1
20 TABLET ORAL 3 TIMES WEEKLY
Qty: 45 TABLET | Refills: 1 | Status: SHIPPED | OUTPATIENT
Start: 2024-08-09 | End: 2025-08-09

## 2024-08-08 RX ORDER — ATORVASTATIN CALCIUM 40 MG/1
40 TABLET, FILM COATED ORAL NIGHTLY
Qty: 90 TABLET | Refills: 1 | Status: SHIPPED | OUTPATIENT
Start: 2024-08-08

## 2024-08-08 RX ORDER — AMLODIPINE BESYLATE 2.5 MG/1
7.5 TABLET ORAL DAILY
Qty: 270 TABLET | Refills: 1 | Status: SHIPPED | OUTPATIENT
Start: 2024-08-08

## 2024-08-08 RX ORDER — TELMISARTAN 80 MG/1
80 TABLET ORAL DAILY
Qty: 90 TABLET | Refills: 1 | Status: SHIPPED | OUTPATIENT
Start: 2024-08-08

## 2024-08-08 NOTE — PATIENT INSTRUCTIONS
PER DR HURST LABS ORDERED ON 3/18//24 THAT ARE PENDING TO BE DONE TODAY    FASTING LABS TO BE DONE AGAIN PRIOR TO YOUR APPOINTMENT WITH DR HURST IN 6 MONTHS (2-3 DAYS BEFORE)    Please bring all medicines, vitamins, and herbal supplements with you in original bottles to every appointment! This is the best way to ensure your medication list in your chart is accurate.    Prescriptions will not be filled unless you are compliant with your follow up appointments or have a follow up appointment scheduled as per instruction of your physician. Refills should be requested at the time of your visit.

## 2024-08-08 NOTE — PROGRESS NOTES
Patient:  Curt Giang  YOB: 1940  MRN: [id number]       HPI:       Curt Giang is a 84 y.o. male who returns today for cardiac follow-up.   He has permanent atrial fibrillation managed with a rate control strategy. He is on Toprol- mg daily and is anticoagulated with Eliquis. He previously opted against pharmacologic or electrical cardioversion. He has atherosclerotic heart disease with previous non-ST segment elevation myocardial infarction in 2001 at which time he underwent angioplasty and stenting of the circumflex. He has essential hypertension and hyperlipidemia.  PVR August 29, 2022 was normal.     He has been doing well since his last visit. He denies any chest pain or shortness of breath. He denies any orthopnea, PND, or increasing peripheral edema. He denies any palpitations, lightheadedness, near-syncope, or syncope. He denies any fever, chills, or cough. He denies any nausea, vomiting, or diaphoresis. He denies any hemoptysis, hematemesis, melena, or hematochezia. Lab studies February 15, 2024 showed a normal CBC and CMP.  Cholesterol 117 with HDL 48, LDL 47, and triglycerides 110.  Magnesium was 2.06.  TSH was normal.  He is currently free of any anginal or CHF symptoms.His blood pressures are well-controlled.  He will continue his same medications.  Other details are as noted below.      The above portion of this note was dictated by me using voice recognition software. I personally performed the services described in the documentation. The scribe entering the documentation below was in my presence. I affirm that the information is both accurate and complete.        Objective:     Vitals:    08/08/24 1034   BP: 112/68   Pulse: 62       Wt Readings from Last 4 Encounters:   08/08/24 80.9 kg (178 lb 6.4 oz)   03/18/24 85.5 kg (188 lb 6.4 oz)   02/22/24 84.6 kg (186 lb 9.6 oz)   09/18/23 82.6 kg (182 lb)       Allergies:     Allergies   Allergen Reactions    Nisoldipine  Itching and Swelling    Simvastatin Other     Stopped due to a potential interaction with Amlodipine    Carvedilol Rash    Clonidine Rash    Dabigatran Etexilate Rash    Hydrochlorothiazide Rash    Losartan Rash    Nitroglycerin Rash    Rivaroxaban Rash          Medications:     Current Outpatient Medications   Medication Instructions    amLODIPine (NORVASC) 7.5 mg, oral, Daily    apixaban (ELIQUIS) 5 mg, oral, 2 times daily    aspirin 81 mg EC tablet 1 tablet, oral, Daily    atorvastatin (LIPITOR) 40 mg, oral, Nightly    docosahexaenoic acid/vit C/lut (EYE HEALTH ORAL) Take 2 tabs in the morning and 2 tabs in the evening    furosemide (LASIX) 20 mg, oral, 3 times weekly    metoprolol succinate XL (TOPROL-XL) 100 mg, oral, Daily    multivitamin,tx-minerals (Vitamins and Minerals) tablet 1 tablet, oral, Daily, Black Cherry 1000 mg    nitroglycerin (Nitrostat) 0.4 mg SL tablet Place 1 tab under the tongue every 5 minutes for up to 3 doses as needed for chest pain. Call 911 if pain persists    potassium chloride CR 10 mEq ER tablet 10 mEq, oral, 3 times weekly    PreviDent 5000 Booster Plus 1.1 % dental paste brush teeth for 2 minutes and expectorate, once daily AT BEDTIME. do not eat or drink for 30 minutes after application.    telmisartan (MICARDIS) 80 mg, oral, Daily       Physical Examination:   GENERAL:  Well developed, well nourished, in no acute distress.  CHEST:  Symmetric and nontender.  NEURO/PSYCH:  Alert and oriented times three with approppriate behavior and responses.  NECK:  Supple, no JVD, no bruit.  LUNGS:  Clear to auscultation bilaterally, normal respiratory effort.  HEART:  Rate and rhythm regular with no evident murmur, no gallop appreciated.        There are no rubs, clicks or heaves.  EXTREMITIES:  Warm with good color, no clubbing or cyanosis.  There is no edema noted.  PERIPHERAL VASCULAR:  Pulses present and equally palpable; 2+ throughout.      Lab:     CBC:   Lab Results   Component Value  Date    WBC 9.3 02/15/2024    RBC 4.45 (L) 02/15/2024    HGB 14.3 02/15/2024    HCT 41.9 02/15/2024     02/15/2024        CMP:    Lab Results   Component Value Date     02/15/2024    K 4.1 02/15/2024     02/15/2024    CO2 30 02/15/2024    BUN 14 02/15/2024    CREATININE 0.90 02/15/2024    GLUCOSE 104 (H) 02/15/2024    CALCIUM 9.3 02/15/2024       Lipid Profile:    Lab Results   Component Value Date    TRIG 110 02/15/2024    HDL 47.6 02/15/2024    LDLCALC 47 02/15/2024       BMP:  Lab Results   Component Value Date     02/15/2024     09/15/2023     06/18/2020     (L) 09/24/2019    K 4.1 02/15/2024    K 4.2 09/15/2023    K 4.0 06/18/2020    K 4.0 09/24/2019     02/15/2024     09/15/2023    CL 98 06/18/2020    CL 99 09/24/2019    CO2 30 02/15/2024    CO2 27 09/15/2023    CO2 31 06/18/2020    CO2 29 09/24/2019    BUN 14 02/15/2024    BUN 13 09/15/2023    BUN 15 06/18/2020    BUN 12 09/24/2019    CREATININE 0.90 02/15/2024    CREATININE 0.88 09/15/2023    CREATININE 0.93 06/18/2020    CREATININE 0.85 09/24/2019       CBC:  Lab Results   Component Value Date    WBC 9.3 02/15/2024    WBC 8.3 09/15/2023    WBC 7.9 09/24/2019    RBC 4.45 (L) 02/15/2024    RBC 4.50 09/15/2023    RBC 4.31 (L) 09/24/2019    HGB 14.3 02/15/2024    HGB 14.3 09/15/2023    HGB 13.7 09/24/2019    HCT 41.9 02/15/2024    HCT 42.3 09/15/2023    HCT 41.2 09/24/2019    MCV 94 02/15/2024    MCV 94 09/15/2023    MCV 96 09/24/2019    MCH 32.1 02/15/2024    MCHC 34.1 02/15/2024    MCHC 33.8 09/15/2023    MCHC 33.3 09/24/2019    RDW 12.0 02/15/2024    RDW 12.0 09/15/2023    RDW 12.3 09/24/2019     02/15/2024     (L) 09/15/2023     (L) 09/24/2019       Hepatic Function Panel:    Lab Results   Component Value Date    ALKPHOS 93 02/15/2024    ALT 21 02/15/2024    AST 22 02/15/2024    PROT 6.9 02/15/2024    BILITOT 1.0 02/15/2024       Magnesium:    Lab Results   Component Value Date     MG 2.06 02/15/2024       TSH:    Lab Results   Component Value Date    TSH 1.53 02/15/2024       Problem List:     Patient Active Problem List   Diagnosis    Atherosclerosis of native coronary artery of native heart without angina pectoris    Primary hypertension    Mixed hyperlipidemia    Permanent atrial fibrillation (Multi)    S/P PTCA (percutaneous transluminal coronary angioplasty)    Venous insufficiency    Class 1 obesity due to excess calories with serious comorbidity and body mass index (BMI) of 31.0 to 31.9 in adult    History of myocardial infarction       Asessment:     Problem List Items Addressed This Visit             ICD-10-CM    Atherosclerosis of native coronary artery of native heart without angina pectoris I25.10    Relevant Medications    amLODIPine (Norvasc) 2.5 mg tablet    metoprolol succinate XL (Toprol-XL) 100 mg 24 hr tablet    Other Relevant Orders    Follow [facility]    [facility]    Comprehensive Metabolic Panel    Lipid Panel    Primary hypertension I10    Relevant Medications    amLODIPine (Norvasc) 2.5 mg tablet    furosemide (Lasix) 20 mg tablet (Start on 8/9/2024)    telmisartan (MIcarDIS) 80 mg tablet    Other Relevant Orders    Follow Up In Cardiology    [facility]    Comprehensive Metabolic Panel    Lipid Panel    Mixed hyperlipidemia E78.2    Relevant Medications    atorvastatin (Lipitor) 40 mg tablet    Other Relevant Orders    Follow Up In Cardiology    [facility]    Comprehensive Metabolic Panel    Lipid Panel    Permanent atrial fibrillation (Multi) I48.21    Relevant Medications    amLODIPine (Norvasc) 2.5 mg tablet    apixaban (Eliquis) 5 mg tablet    metoprolol succinate XL (Toprol-XL) 100 mg 24 hr tablet    Other Relevant Orders    Follow Up In Cardiology    [facility]    Comprehensive Metabolic Panel    Lipid Panel    Venous insufficiency I87.2    Relevant Medications    furosemide (Lasix) 20 mg tablet (Start on 8/9/2024)    telmisartan (MIcarDIS) 80 mg tablet    Other Relevant Orders     Follow Up In Cardiology    CBC    Comprehensive Metabolic Panel    Lipid Panel     Other Visit Diagnoses         Codes    Congestive heart failure, unspecified HF chronicity, unspecified heart failure type (Multi)     I50.9    Relevant Medications    amLODIPine (Norvasc) 2.5 mg tablet    metoprolol succinate XL (Toprol-XL) 100 mg 24 hr tablet    potassium chloride CR 10 mEq ER tablet (Start on 8/9/2024)    Other Relevant Orders    Follow Up In Cardiology    CBC    Comprehensive Metabolic Panel    Lipid Panel

## 2024-08-09 NOTE — TELEPHONE ENCOUNTER
----- Message from Jay Jay Andersen MD sent at 2/15/2024  3:43 PM EST -----  Labs reviewed and are normal.  Continue same and follow-up as scheduled.  Thanks.   HPI:   Farzad Romo is a 53 year old male who presents virtually for the management of his diabetes.   This visit is conducted using Telemedicine with live, two way, interactive video and audio.  Patient verbally consents to Telemedicine visit.  Patient understands and accepts financial responsibility for any deductible, co-insurance and/or co-pays associated with this service.    Chief Complaint: Diabetes Follow Up    Diabetes: needs improvement  Type: 2   Duration:dx 2014  Current Meds: Metformin 1000mg po bid, Ozempic 0.5mg injection weekly x 1 week, Semglee 27 units injection daily - not taking   SE: denies  Long term insulin use: yes  Failed Meds/Previous Tx: Onglyza, glipizide, Lantus, Glyxambi, Tresiba, Basaglar    Complications: Neuropathy, Proteinuria, Retinopathy    Testing with: Contour Next  *Placed Freestyle Stanford 3 sensor yesterday   Hypoglycemia frequency:  denies     Overall glucose control:   HGBA1C:    Lab Results   Component Value Date    A1C 8.1 (H) 05/20/2024    A1C 11.7 (H) 01/25/2024    A1C 10.0 (H) 09/23/2022     (H) 05/20/2024          Wt Readings from Last 3 Encounters:   07/02/24 196 lb (88.9 kg)   06/07/24 (P) 197 lb (89.4 kg)   05/20/24 196 lb (88.9 kg)           Past History:   He  has a past medical history of Essential hypertension and Type II or unspecified type diabetes mellitus without mention of complication, not stated as uncontrolled.   His family history includes Dementia in his maternal aunt, maternal aunt, and maternal aunt; Diabetes in his brother, father, mother, sister, and sister; Hypertension in his father and mother.   He  reports that he has never smoked. He has never been exposed to tobacco smoke. He has never used smokeless tobacco. He reports that he does not currently use alcohol. He reports that he does not use drugs.     He is allergic to almond, almond (diagnostic), and seasonal.     Current Outpatient Medications on File Prior to Visit   Medication  Sig    [DISCONTINUED] semaglutide (OZEMPIC, 0.25 OR 0.5 MG/DOSE,) 2 MG/3ML Subcutaneous Solution Pen-injector Inject 0.25 mg into the skin once a week. X 4 weeks then if tolerating increase to 0.5mg injection once weekly    FREESTYLE SUNNY 3 SENSOR Does not apply Misc 1 each every 14 (fourteen) days.    METFORMIN HCL 1000 MG Oral Tab TAKE 1 TABLET(1000 MG) BY MOUTH TWICE DAILY    atorvastatin 20 MG Oral Tab Take 1 tablet (20 mg total) by mouth nightly.    Glucose Blood (CONTOUR NEXT TEST) In Vitro Strip 1 each by Other route 3 (three) times daily.    montelukast 10 MG Oral Tab Take 1 tablet (10 mg total) by mouth nightly.    LOSARTAN 100 MG Oral Tab TAKE 1 TABLET(100 MG) BY MOUTH DAILY    Blood Pressure Monitor Does not apply Device Use to monitor blood pressure at home    Blood Pressure Monitoring Does not apply Device Monitor blood pressure daily    Blood Pressure Monitoring (BLOOD PRESSURE KIT) Does not apply Device 1 each daily.    cyanocobalamin ([employer] VITAMIN B12) 1000 MCG Oral Tab Take 1 tablet (1,000 mcg total) by mouth daily.    Blood Glucose Monitoring Suppl (CONTOUR NEXT EZ) w/Device Does not apply Kit 1 kit by Does not apply route daily.    MICROLET LANCETS Does not apply Misc TEST THREE TIMES DAILY    aspirin (ASPIR-81) 81 MG Oral Tab EC Take 1 tablet by mouth daily.     No current facility-administered medications on file prior to visit.       CMP  (most recent labs)   Lab Results   Component Value Date/Time     (H) 05/20/2024 09:28 AM    BUN 14 05/20/2024 09:28 AM    CREATSERUM 1.02 05/20/2024 09:28 AM    GFRNAA 112 07/19/2021 11:50 AM    GFRAA 129 07/19/2021 11:50 AM    EGFRCR 89 05/20/2024 09:28 AM    CA 9.4 05/20/2024 09:28 AM    ALKPHO 60 05/20/2024 09:28 AM    AST 15 05/20/2024 09:28 AM    ALT 30 05/20/2024 09:28 AM    BILT 0.7 05/20/2024 09:28 AM    TP 8.2 05/20/2024 09:28 AM    ALB 4.2 05/20/2024 09:28 AM     05/20/2024 09:28 AM    K 4.2 05/20/2024 09:28 AM     05/20/2024 09:28  AM    CO2 29.0 05/20/2024 09:28 AM           Lipids  (most recent labs)   Lab Results   Component Value Date/Time    CHOLEST 158 05/20/2024 09:28 AM    TRIG 63 05/20/2024 09:28 AM    HDL 51 05/20/2024 09:28 AM    LDL 94 05/20/2024 09:28 AM    NONHDLC 107 05/20/2024 09:28 AM          Diabetes  (most recent labs)   Lab Results   Component Value Date/Time    A1C 8.1 (H) 05/20/2024 09:28 AM          Microalb (most recent labs)   Lab Results   Component Value Date/Time    MICROALBUMIN 7.9 12/30/2014 09:48 AM    MICROALBCREA 600.0 (H) 05/20/2024 09:28 AM        Lab results reviewed with patient.    REVIEW OF SYSTEMS:   GENERAL HEALTH: feels well otherwise  SKIN: denies any unusual skin lesions or rashes  RESPIRATORY: denies shortness of breath with exertion  CARDIOVASCULAR: denies chest pain on exertion  GI: denies abdominal pain and denies heartburn  NEURO: denies headaches     EXAM:   Physical Exam  Constitutional:       Appearance: Normal appearance.   Pulmonary:      Comments: Able to speak in complete sentences without difficulty  Neurological:      Mental Status: He is alert and oriented to person, place, and time.   Psychiatric:         Mood and Affect: Mood normal.         Behavior: Behavior normal.         ASSESSMENT AND PLAN:   Diabetes:  Patient to continue Metformin 1000mg po bid and Ozempic 0.5mg injection weekly.  Remain off of Semglee at this time.  Per ADA guidelines, in patients with type 2 diabetes and established ASCVD, incorporating agent proven to reduce major adverse CV events and CV mortality   GLP-1 Ozempic rx chosen since it not only lowers A1C, but studies have shown it can also reduce the risk of major CV events such as heart attack, stroke, or death in adults with type 2 diabetes who are currently treating their CV disease.  Several trials have demonstrated that GLP-1 agonist medications can reduce aminotransferase levels and improve liver histology in patients with NAFLD, suggesting that these  agents could serve as an alternative treatment option for these patients    Patient to continue to use personal Freestyle Stanford 3 CGM for glucose monitoring.  Discussed self monitoring blood glucose and the importance of monitoring.  Patient agreed to monitoring bid - fasting and 2 hours postprandial of one meal per day if not using CGM.  Reinforced healthy eating in healthy portions and increasing daily physical activity.  Patient to have A1c done prior to next virtual visit  Reviewed ways to improve the protein in the urine:   Keep blood sugars in target range   Keep blood pressure in target range   Watch over the counter medicines like NSAID (non steroidal anti-inflammatory drugs) these can be harmful to  kidneys (examples include: , Motrin , Advil, ibuprofen, naprosyn, Aleve)   Continue ARB therapy - renoprotective    Hypertension:  Patient to continue ARB therapy    Hyperlipidemia:  Patient to continue statin therapy    DM Health Maintenance  Last dilated eye exam: Last Dilated Eye Exam: 24   Exam shows retinopathy? Eye Exam shows Diabetic Retinopathy?: Yes    Last diabetic foot exam: Last Foot Exam: 24    Date of last PHQ-2 depression screen: PHQ-2 - Date of last depression screenin2024    Patient  reports that he has never smoked. He has never been exposed to tobacco smoke. He has never used smokeless tobacco.  When is flu vaccine due? No recommendations at this time  When is pneumonia vaccine due? No recommendations at this time    The patient indicates understanding of these issues and agrees to the plan.  Refills addressed at time of office visit.    Diagnoses and all orders for this visit:    Type 2 diabetes mellitus with hyperglycemia, with long-term current use of insulin (HCC)  -     semaglutide (OZEMPIC, 0.25 OR 0.5 MG/DOSE,) 2 MG/3ML Subcutaneous Solution Pen-injector; Inject 0.5 mg into the skin once a week.    Primary hypertension    Mixed hyperlipidemia    Macroalbuminuric  diabetic nephropathy (HCC)       Return in about 4 weeks (around 9/6/2024) for 45 minute appointment, Personal CGM, Virtual Visit.    The risks and benefits of my recommendations, as well as other treatment options were discussed with the patient today. questions were answered to the best of my knowledge.  Patient verbalizes understanding and amendable to plan of care.  Duration of this service:  15 minutes   Laly PERAZA, BC-ADM, [facility]

## 2024-08-12 ENCOUNTER — TELEPHONE (OUTPATIENT)
Dept: CARDIOLOGY | Facility: CLINIC | Age: 84
End: 2024-08-12
Payer: MEDICARE

## 2024-08-12 NOTE — TELEPHONE ENCOUNTER
Left detailed message on patient's personal voicemail advising of message. Encouraged patient to return the call if there are any questions or concerns. Yajaira Figueroa MA

## 2024-08-12 NOTE — TELEPHONE ENCOUNTER
----- Message from Jay Jay Andersen sent at 8/9/2024  4:24 PM EDT -----  Labs reviewed and are normal.  Continue same and follow-up as scheduled.  Thanks.

## 2025-01-15 ENCOUNTER — TELEPHONE (OUTPATIENT)
Dept: CARDIOLOGY | Facility: CLINIC | Age: 85
End: 2025-01-15
Payer: MEDICARE

## 2025-01-15 NOTE — TELEPHONE ENCOUNTER
Patient called office requesting lab order papers for pending appointment.  Patient states he uses a  lab.  Patient informed orders are in system all he has to do is check in a week before his appointment in a fasting state and check in.  Patient verbalized understanding.  Nuvia Schafer, [facility]

## 2025-01-29 LAB
ALBUMIN SERPL-MCNC: 4.1 G/DL (ref 3.6–5.1)
ALP SERPL-CCNC: 109 U/L (ref 35–144)
ALT SERPL-CCNC: 14 U/L (ref 9–46)
ANION GAP SERPL CALCULATED.4IONS-SCNC: 9 MMOL/L (CALC) (ref 7–17)
AST SERPL-CCNC: 16 U/L (ref 10–35)
BILIRUB SERPL-MCNC: 0.9 MG/DL (ref 0.2–1.2)
BUN SERPL-MCNC: 12 MG/DL (ref 7–25)
CALCIUM SERPL-MCNC: 8.5 MG/DL (ref 8.6–10.3)
CHLORIDE SERPL-SCNC: 105 MMOL/L (ref 98–110)
CHOLEST SERPL-MCNC: 113 MG/DL
CHOLEST/HDLC SERPL: 2.5 (CALC)
CO2 SERPL-SCNC: 27 MMOL/L (ref 20–32)
CREAT SERPL-MCNC: 0.82 MG/DL (ref 0.7–1.22)
EGFRCR SERPLBLD CKD-EPI 2021: 88 ML/MIN/1.73M2
ERYTHROCYTE [DISTWIDTH] IN BLOOD BY AUTOMATED COUNT: 11.9 % (ref 11–15)
GLUCOSE SERPL-MCNC: 83 MG/DL (ref 65–99)
HCT VFR BLD AUTO: 41.2 % (ref 38.5–50)
HDLC SERPL-MCNC: 45 MG/DL
HGB BLD-MCNC: 13.9 G/DL (ref 13.2–17.1)
LDLC SERPL CALC-MCNC: 52 MG/DL (CALC)
MCH RBC QN AUTO: 31.8 PG (ref 27–33)
MCHC RBC AUTO-ENTMCNC: 33.7 G/DL (ref 32–36)
MCV RBC AUTO: 94.3 FL (ref 80–100)
NONHDLC SERPL-MCNC: 68 MG/DL (CALC)
PLATELET # BLD AUTO: 156 THOUSAND/UL (ref 140–400)
PMV BLD REES-ECKER: 10 FL (ref 7.5–12.5)
POTASSIUM SERPL-SCNC: 3.7 MMOL/L (ref 3.5–5.3)
PROT SERPL-MCNC: 7 G/DL (ref 6.1–8.1)
RBC # BLD AUTO: 4.37 MILLION/UL (ref 4.2–5.8)
SODIUM SERPL-SCNC: 141 MMOL/L (ref 135–146)
TRIGL SERPL-MCNC: 80 MG/DL
WBC # BLD AUTO: 10 THOUSAND/UL (ref 3.8–10.8)

## 2025-02-07 ENCOUNTER — APPOINTMENT (OUTPATIENT)
Dept: CARDIOLOGY | Facility: CLINIC | Age: 85
End: 2025-02-07
Payer: MEDICARE

## 2025-02-20 DIAGNOSIS — I10 PRIMARY HYPERTENSION: ICD-10-CM

## 2025-02-20 DIAGNOSIS — I48.21 PERMANENT ATRIAL FIBRILLATION (MULTI): ICD-10-CM

## 2025-02-20 DIAGNOSIS — I87.2 VENOUS INSUFFICIENCY: ICD-10-CM

## 2025-02-20 DIAGNOSIS — I50.9 CONGESTIVE HEART FAILURE, UNSPECIFIED HF CHRONICITY, UNSPECIFIED HEART FAILURE TYPE: ICD-10-CM

## 2025-02-25 RX ORDER — AMLODIPINE BESYLATE 2.5 MG/1
7.5 TABLET ORAL DAILY
Qty: 270 TABLET | Refills: 3 | OUTPATIENT
Start: 2025-02-25

## 2025-02-25 RX ORDER — TELMISARTAN 80 MG/1
80 TABLET ORAL DAILY
Qty: 90 TABLET | Refills: 3 | OUTPATIENT
Start: 2025-02-25

## 2025-02-25 RX ORDER — APIXABAN 5 MG/1
5 TABLET, FILM COATED ORAL 2 TIMES DAILY
Qty: 180 TABLET | Refills: 3 | OUTPATIENT
Start: 2025-02-25

## 2025-02-25 RX ORDER — METOPROLOL SUCCINATE 100 MG/1
100 TABLET, EXTENDED RELEASE ORAL DAILY
Qty: 90 TABLET | Refills: 3 | OUTPATIENT
Start: 2025-02-25

## 2025-02-27 ENCOUNTER — TELEPHONE (OUTPATIENT)
Dept: CARDIOLOGY | Facility: CLINIC | Age: 85
End: 2025-02-27
Payer: MEDICARE

## 2025-02-27 NOTE — TELEPHONE ENCOUNTER
Fax received from [facility] requesting:    Telmisartan 80 mg 1 tablet daily  Eliquis 5 mg twice daily  Metoprorolol Succinate 100 mg 1 tablet daily  Amlodipine 2.5 mg 3 tablets once a day    Called patient needs appointment for refills.  Left voice mail instructing patient to call the office.  All rx requests faxed to [facility] [phone] with notation stating patient needs appointment.  Nuvia Schafer, CMA

## 2025-02-28 NOTE — TELEPHONE ENCOUNTER
02/28/25 There is an active task in RX Request for this patient. We have left message to call to schedule appointment for medication refill.

## 2025-03-10 DIAGNOSIS — E78.2 MIXED HYPERLIPIDEMIA: ICD-10-CM

## 2025-03-13 ENCOUNTER — APPOINTMENT (OUTPATIENT)
Dept: CARDIOLOGY | Facility: CLINIC | Age: 85
End: 2025-03-13
Payer: MEDICARE

## 2025-03-14 NOTE — TELEPHONE ENCOUNTER
Called patient.  He cancelled his appointment 3/13/25.  Patient pending follow up with PCP 3/18/25.  Patient needs cardiology follow up to fill cardiac medications.  Nuvia Schafer, CMA

## 2025-03-18 ENCOUNTER — APPOINTMENT (OUTPATIENT)
Dept: PRIMARY CARE | Facility: CLINIC | Age: 85
End: 2025-03-18
Payer: MEDICARE

## 2025-03-18 VITALS
HEIGHT: 65 IN | SYSTOLIC BLOOD PRESSURE: 103 MMHG | RESPIRATION RATE: 16 BRPM | HEART RATE: 83 BPM | OXYGEN SATURATION: 97 % | DIASTOLIC BLOOD PRESSURE: 70 MMHG | WEIGHT: 179.7 LBS | BODY MASS INDEX: 29.94 KG/M2 | TEMPERATURE: 97.7 F

## 2025-03-18 DIAGNOSIS — Z71.89 ADVANCED DIRECTIVES, COUNSELING/DISCUSSION: ICD-10-CM

## 2025-03-18 DIAGNOSIS — Z23 ENCOUNTER FOR IMMUNIZATION: ICD-10-CM

## 2025-03-18 DIAGNOSIS — I10 PRIMARY HYPERTENSION: ICD-10-CM

## 2025-03-18 DIAGNOSIS — E83.51 HYPOCALCEMIA: ICD-10-CM

## 2025-03-18 DIAGNOSIS — C44.41 BASAL CELL CARCINOMA (BCC) OF SCALP: ICD-10-CM

## 2025-03-18 DIAGNOSIS — Z00.00 ROUTINE GENERAL MEDICAL EXAMINATION AT HEALTH CARE FACILITY: Primary | ICD-10-CM

## 2025-03-18 DIAGNOSIS — Z23 NEED FOR VACCINATION: ICD-10-CM

## 2025-03-18 DIAGNOSIS — E78.2 MIXED HYPERLIPIDEMIA: ICD-10-CM

## 2025-03-18 DIAGNOSIS — I48.21 PERMANENT ATRIAL FIBRILLATION (MULTI): ICD-10-CM

## 2025-03-18 PROCEDURE — 1160F RVW MEDS BY RX/DR IN RCRD: CPT | Performed by: FAMILY MEDICINE

## 2025-03-18 PROCEDURE — 1158F ADVNC CARE PLAN TLK DOCD: CPT | Performed by: FAMILY MEDICINE

## 2025-03-18 PROCEDURE — 1036F TOBACCO NON-USER: CPT | Performed by: FAMILY MEDICINE

## 2025-03-18 PROCEDURE — 91322 SARSCOV2 VAC 50 MCG/0.5ML IM: CPT | Performed by: FAMILY MEDICINE

## 2025-03-18 PROCEDURE — 3078F DIAST BP <80 MM HG: CPT | Performed by: FAMILY MEDICINE

## 2025-03-18 PROCEDURE — 99497 ADVNCD CARE PLAN 30 MIN: CPT | Performed by: FAMILY MEDICINE

## 2025-03-18 PROCEDURE — 1123F ACP DISCUSS/DSCN MKR DOCD: CPT | Performed by: FAMILY MEDICINE

## 2025-03-18 PROCEDURE — G0439 PPPS, SUBSEQ VISIT: HCPCS | Performed by: FAMILY MEDICINE

## 2025-03-18 PROCEDURE — 1159F MED LIST DOCD IN RCRD: CPT | Performed by: FAMILY MEDICINE

## 2025-03-18 PROCEDURE — 1170F FXNL STATUS ASSESSED: CPT | Performed by: FAMILY MEDICINE

## 2025-03-18 PROCEDURE — 90480 ADMN SARSCOV2 VAC 1/ONLY CMP: CPT | Performed by: FAMILY MEDICINE

## 2025-03-18 PROCEDURE — 3074F SYST BP LT 130 MM HG: CPT | Performed by: FAMILY MEDICINE

## 2025-03-18 PROCEDURE — 99214 OFFICE O/P EST MOD 30 MIN: CPT | Performed by: FAMILY MEDICINE

## 2025-03-18 ASSESSMENT — ENCOUNTER SYMPTOMS
WHEEZING: 0
APPETITE CHANGE: 0
PALPITATIONS: 0
LIGHT-HEADEDNESS: 0
TREMORS: 0
NECK PAIN: 0
SINUS PAIN: 0
HALLUCINATIONS: 0
CHILLS: 0
ARTHRALGIAS: 0
WEAKNESS: 0
RHINORRHEA: 0
DYSPHORIC MOOD: 0
NERVOUS/ANXIOUS: 0
DIAPHORESIS: 0
EYE REDNESS: 0
NAUSEA: 0
DIARRHEA: 0
SEIZURES: 0
EYE PAIN: 0
DIZZINESS: 0
POLYDIPSIA: 0
HYPERTENSION: 1
MYALGIAS: 0
DYSURIA: 0
CHEST TIGHTNESS: 0
COUGH: 0
CONSTIPATION: 0
ABDOMINAL DISTENTION: 0
ACTIVITY CHANGE: 0
NUMBNESS: 0
WOUND: 0
BLOOD IN STOOL: 0
AGITATION: 0
ADENOPATHY: 0
VOMITING: 0
FREQUENCY: 0
ABDOMINAL PAIN: 0
SPEECH DIFFICULTY: 0
PHOTOPHOBIA: 0
SHORTNESS OF BREATH: 0
SORE THROAT: 0
FATIGUE: 0
BACK PAIN: 0
CHOKING: 0
DEPRESSION: 0
JOINT SWELLING: 0
TROUBLE SWALLOWING: 0
SINUS PRESSURE: 0
EYE ITCHING: 0
FACIAL SWELLING: 0
SLEEP DISTURBANCE: 0
HEADACHES: 0
LOSS OF SENSATION IN FEET: 0
UNEXPECTED WEIGHT CHANGE: 0
OCCASIONAL FEELINGS OF UNSTEADINESS: 0
VOICE CHANGE: 0
FLANK PAIN: 0
EYE DISCHARGE: 0
CONFUSION: 0
BRUISES/BLEEDS EASILY: 0
FACIAL ASYMMETRY: 0
FEVER: 0
HEMATURIA: 0
NECK STIFFNESS: 0

## 2025-03-18 ASSESSMENT — ACTIVITIES OF DAILY LIVING (ADL)
DOING_HOUSEWORK: INDEPENDENT
GROCERY_SHOPPING: INDEPENDENT
TAKING_MEDICATION: INDEPENDENT
BATHING: INDEPENDENT
DRESSING: INDEPENDENT
MANAGING_FINANCES: INDEPENDENT

## 2025-03-18 ASSESSMENT — PATIENT HEALTH QUESTIONNAIRE - PHQ9
SUM OF ALL RESPONSES TO PHQ9 QUESTIONS 1 AND 2: 0
2. FEELING DOWN, DEPRESSED OR HOPELESS: NOT AT ALL
1. LITTLE INTEREST OR PLEASURE IN DOING THINGS: NOT AT ALL

## 2025-03-18 NOTE — PROGRESS NOTES
Subjective   Patient ID: MARISELA Giang is a 84 y.o. male who presents for Medicare Annual Wellness Visit Subsequent (Review labs from Dr. Andersen).      Hypertension  This is a chronic problem. The current episode started more than 1 year ago. The problem is unchanged. The problem is controlled. Associated symptoms include peripheral edema. Pertinent negatives include no chest pain, headaches, neck pain, palpitations or shortness of breath. There are no associated agents to hypertension. Risk factors for coronary artery disease include dyslipidemia, male gender and obesity. Past treatments include diuretics, calcium channel blockers, beta blockers and angiotensin blockers. The current treatment provides significant improvement. There are no compliance problems.  Hypertensive end-organ damage includes CAD/MI. There is no history of a hypertension causing med or a thyroid problem.   Hyperlipidemia  This is a chronic problem. The current episode started more than 1 year ago. The problem is controlled. Recent lipid tests were reviewed and are normal. Factors aggravating his hyperlipidemia include beta blockers. Pertinent negatives include no chest pain, myalgias or shortness of breath. Current antihyperlipidemic treatment includes statins. The current treatment provides significant improvement of lipids. There are no compliance problems.         Review of Systems   Constitutional:  Negative for activity change, appetite change, chills, diaphoresis, fatigue, fever and unexpected weight change.   HENT:  Negative for congestion, dental problem, drooling, ear discharge, ear pain, facial swelling, hearing loss, mouth sores, nosebleeds, postnasal drip, rhinorrhea, sinus pressure, sinus pain, sneezing, sore throat, tinnitus, trouble swallowing and voice change.    Eyes:  Negative for photophobia, pain, discharge, redness, itching and visual disturbance.   Respiratory:  Negative for cough, choking, chest tightness, shortness of  "breath and wheezing.    Cardiovascular:  Negative for chest pain, palpitations and leg swelling.   Gastrointestinal:  Negative for abdominal distention, abdominal pain, blood in stool, constipation, diarrhea, nausea and vomiting.   Endocrine: Negative for cold intolerance, heat intolerance, polydipsia and polyuria.   Genitourinary:  Negative for dysuria, flank pain, frequency, hematuria and urgency.   Musculoskeletal:  Negative for arthralgias, back pain, joint swelling, myalgias, neck pain and neck stiffness.   Skin:  Negative for rash and wound.        Skin lesion left posterior scalp history of BCC in that area   Allergic/Immunologic: Negative for immunocompromised state.   Neurological:  Negative for dizziness, tremors, seizures, syncope, facial asymmetry, speech difficulty, weakness, light-headedness, numbness and headaches.   Hematological:  Negative for adenopathy. Does not bruise/bleed easily.   Psychiatric/Behavioral:  Negative for agitation, behavioral problems, confusion, dysphoric mood, hallucinations, self-injury, sleep disturbance and suicidal ideas. The patient is not nervous/anxious.        Objective   /70   Pulse 83   Temp 36.5 °C (97.7 °F) (Temporal)   Resp 16   Ht 1.651 m (5' 5\")   Wt 81.5 kg (179 lb 11.2 oz)   SpO2 97%   BMI 29.90 kg/m²     Physical Exam  Constitutional:       General: He is not in acute distress.     Appearance: He is not ill-appearing or diaphoretic.   HENT:      Head: Normocephalic and atraumatic.        Comments: Skin lesion left side of scalp behind left ear consistent with BCC     Right Ear: External ear normal.      Left Ear: External ear normal.      Nose: Nose normal. No rhinorrhea.   Eyes:      General: Lids are normal. No scleral icterus.        Right eye: No discharge.         Left eye: No discharge.      Conjunctiva/sclera: Conjunctivae normal.   Cardiovascular:      Rate and Rhythm: Normal rate. Rhythm irregularly irregular.      Pulses: Normal pulses. "      Heart sounds: No murmur heard.  Pulmonary:      Effort: Pulmonary effort is normal. No respiratory distress.      Breath sounds: No decreased breath sounds, wheezing, rhonchi or rales.   Abdominal:      General: Bowel sounds are normal. There is no distension.      Palpations: Abdomen is soft. There is no mass.      Tenderness: There is no abdominal tenderness. There is no guarding or rebound.   Musculoskeletal:         General: No swelling, tenderness or deformity.      Cervical back: No rigidity or tenderness.      Right lower leg: No edema.      Left lower leg: Edema (tr) present.   Lymphadenopathy:      Cervical: No cervical adenopathy.      Upper Body:      Right upper body: No supraclavicular adenopathy.      Left upper body: No supraclavicular adenopathy.   Skin:     General: Skin is warm and dry.      Coloration: Skin is not jaundiced or pale.      Findings: No erythema, lesion or rash.   Neurological:      General: No focal deficit present.      Mental Status: He is alert and oriented to person, place, and time.      Sensory: No sensory deficit.      Motor: No weakness or tremor.      Coordination: Coordination normal.      Gait: Gait normal.   Psychiatric:         Mood and Affect: Mood normal. Affect is not inappropriate.         Behavior: Behavior normal.         Assessment/Plan   Diagnoses and all orders for this visit:  Routine general medical examination at health care facility  -     Follow Up In [facility] - Medicare Annual  Encounter for immunization  -     Follow Up In [facility] - Medicare Annual  Advanced directives, counseling/discussion  -     Follow Up In [facility] - Medicare Annual  -     Full code  Mixed hyperlipidemia  -     Follow Up In [facility] - PCP - Medicare Annual  -     Comprehensive Metabolic Panel; Future  -     Lipid Panel; Future  -     TSH with reflex to Free T4 if abnormal; Future  Primary hypertension  -      Follow Up In [facility] - PCP - Medicare Annual  -     CBC and Auto Differential; Future  -     Comprehensive Metabolic Panel; Future  -     Lipid Panel; Future  -     Magnesium; Future  -     TSH with reflex to Free T4 if abnormal; Future  -     Follow Up In [facility] - PCP - Medicare Annual; Future  Need for vaccination  -     Moderna COVID-19 vaccine, monovalent, age 12 years and older, (50mcg/0.5mL)(Spikevax)  Permanent atrial fibrillation (Multi)  -     CBC and Auto Differential; Future  -     Comprehensive Metabolic Panel; Future  -     Magnesium; Future  -     TSH with reflex to Free T4 if abnormal; Future  Hypocalcemia  -     Comprehensive Metabolic Panel; Future  -     Vitamin D 25-Hydroxy,Total (for eval of Vitamin D levels); Future  Basal cell carcinoma (BCC) of scalp  -     Referral to Dermatology         Advance care planning was discussed including living will, power of  for healthcare and living will.  Advance care planning packet will be provided to patient at discharge.  Patient was encouraged to bring in any advanced care planning paperwork to file on the chart at their own convenience.  (16min spent discussing above)

## 2025-03-19 RX ORDER — ATORVASTATIN CALCIUM 40 MG/1
40 TABLET, FILM COATED ORAL NIGHTLY
Qty: 90 TABLET | Refills: 3 | OUTPATIENT
Start: 2025-03-19

## 2025-03-19 NOTE — TELEPHONE ENCOUNTER
Called patient regarding need for appointment to fill medications.  Patient was not available.  Left voice mail instructing patient to call the office.  Letter mailed to patient.  Note routed to CYNDI Plaza, CNP to deny rx.  Nuvia Schafer, CMA

## 2025-04-24 ENCOUNTER — APPOINTMENT (OUTPATIENT)
Dept: CARDIOLOGY | Facility: CLINIC | Age: 85
End: 2025-04-24
Payer: MEDICARE

## 2025-04-24 VITALS
DIASTOLIC BLOOD PRESSURE: 72 MMHG | HEART RATE: 76 BPM | SYSTOLIC BLOOD PRESSURE: 128 MMHG | WEIGHT: 183 LBS | BODY MASS INDEX: 30.49 KG/M2 | HEIGHT: 65 IN

## 2025-04-24 DIAGNOSIS — I48.21 PERMANENT ATRIAL FIBRILLATION (MULTI): ICD-10-CM

## 2025-04-24 DIAGNOSIS — I87.2 VENOUS INSUFFICIENCY: ICD-10-CM

## 2025-04-24 DIAGNOSIS — E78.2 MIXED HYPERLIPIDEMIA: ICD-10-CM

## 2025-04-24 DIAGNOSIS — I50.9 CONGESTIVE HEART FAILURE, UNSPECIFIED HF CHRONICITY, UNSPECIFIED HEART FAILURE TYPE: ICD-10-CM

## 2025-04-24 DIAGNOSIS — I10 PRIMARY HYPERTENSION: ICD-10-CM

## 2025-04-24 PROCEDURE — 3078F DIAST BP <80 MM HG: CPT | Performed by: INTERNAL MEDICINE

## 2025-04-24 PROCEDURE — 1159F MED LIST DOCD IN RCRD: CPT | Performed by: INTERNAL MEDICINE

## 2025-04-24 PROCEDURE — 3074F SYST BP LT 130 MM HG: CPT | Performed by: INTERNAL MEDICINE

## 2025-04-24 PROCEDURE — 99214 OFFICE O/P EST MOD 30 MIN: CPT | Performed by: INTERNAL MEDICINE

## 2025-04-24 PROCEDURE — 1123F ACP DISCUSS/DSCN MKR DOCD: CPT | Performed by: INTERNAL MEDICINE

## 2025-04-24 RX ORDER — AMLODIPINE BESYLATE 2.5 MG/1
7.5 TABLET ORAL DAILY
Qty: 270 TABLET | Refills: 3 | Status: SHIPPED | OUTPATIENT
Start: 2025-04-24

## 2025-04-24 RX ORDER — ATORVASTATIN CALCIUM 40 MG/1
40 TABLET, FILM COATED ORAL NIGHTLY
Qty: 90 TABLET | Refills: 3 | Status: SHIPPED | OUTPATIENT
Start: 2025-04-24

## 2025-04-24 RX ORDER — FUROSEMIDE 20 MG/1
20 TABLET ORAL 3 TIMES WEEKLY
Qty: 45 TABLET | Refills: 3 | Status: SHIPPED | OUTPATIENT
Start: 2025-04-25

## 2025-04-24 RX ORDER — METOPROLOL SUCCINATE 100 MG/1
100 TABLET, EXTENDED RELEASE ORAL DAILY
Qty: 90 TABLET | Refills: 3 | Status: SHIPPED | OUTPATIENT
Start: 2025-04-24

## 2025-04-24 NOTE — PROGRESS NOTES
Patient:  Curt Giang  YOB: 1940  MRN: [id number]       Chief Complaint:      Chief Complaint   Patient presents with    Follow-up     Six month follow up        HPI:       Curt Giang is a 85 y.o. male who returns today for cardiac follow-up.  He has permanent atrial fibrillation managed with a rate control strategy. He is on Toprol- mg daily and is anticoagulated with Eliquis. He previously opted against pharmacologic or electrical cardioversion. He has atherosclerotic heart disease with previous non-ST segment elevation myocardial infarction in 2001 at which time he underwent angioplasty and stenting of the circumflex. He has essential hypertension and hyperlipidemia.  PVR August 29, 2022 was normal.    He continues to do well since his last visit.  [place] passed away, [place]  He denies any chest pain or shortness of breath. He denies any orthopnea, PND, or increasing peripheral edema. He denies any palpitations, lightheadedness, near-syncope, or syncope. He denies any fever, chills, or cough. He denies any nausea, vomiting, or diaphoresis. He denies any hemoptysis, hematemesis, melena, or hematochezia. Lab studies January 28, 2025 showed a normal CBC and CMP with the exception of calcium 8.5.  Cholesterol 113 with HDL 45, triglycerides 80, and LDL 52.  He is currently free of any anginal or CHF symptoms.His blood pressures are well-controlled.  Continue anticoagulation with Eliquis.  Continue amlodipine and telmisartan for hypertension.  He will also continue on aspirin and atorvastatin for underlying ASHD.  Other details are as noted below.     The above portion of this note was dictated by me using voice recognition software. I personally performed the services described in the documentation. The scribe entering the documentation below was in my presence. I affirm that the information is both accurate and  complete.      Objective:     Vitals:    04/24/25 0953   BP: 128/72   Pulse: 76       Wt Readings from Last 4 Encounters:   03/18/25 81.5 kg (179 lb 11.2 oz)   08/08/24 80.9 kg (178 lb 6.4 oz)   03/18/24 85.5 kg (188 lb 6.4 oz)   02/22/24 84.6 kg (186 lb 9.6 oz)       Allergies:     Allergies[1]       Medications:     Current Outpatient Medications   Medication Instructions    amLODIPine (NORVASC) 7.5 mg, oral, Daily    apixaban (ELIQUIS) 5 mg, oral, 2 times daily    aspirin 81 mg EC tablet 1 tablet, Daily    atorvastatin (LIPITOR) 40 mg, oral, Nightly    docosahexaenoic acid/vit C/lut (EYE HEALTH ORAL) Take 2 tabs in the morning and 2 tabs in the evening    furosemide (LASIX) 20 mg, oral, 3 times weekly    metoprolol succinate XL (TOPROL-XL) 100 mg, oral, Daily    multivitamin,tx-minerals (Vitamins and Minerals) tablet 1 tablet, Daily    nitroglycerin (Nitrostat) 0.4 mg SL tablet Place 1 tab under the tongue every 5 minutes for up to 3 doses as needed for chest pain. Call 911 if pain persists    potassium chloride CR 10 mEq ER tablet 10 mEq, oral, 3 times weekly    PreviDent 5000 Booster Plus 1.1 % dental paste brush teeth for 2 minutes and expectorate, once daily AT BEDTIME. do not eat or drink for 30 minutes after application.    telmisartan (MICARDIS) 80 mg, oral, Daily       Physical Examination:   GENERAL:  Well developed, well nourished, in no acute distress.  CHEST:  Symmetric and nontender.  NEURO/PSYCH:  Alert and oriented times three with approppriate behavior and responses.  NECK:  Supple, no JVD, no bruit.  LUNGS:  Clear to auscultation bilaterally, normal respiratory effort.  HEART:  Rate and rhythm regular with no evident murmur, no gallop appreciated.        There are no rubs, clicks or heaves.  EXTREMITIES:  Warm with good color, no clubbing or cyanosis.  There is no edema noted.  PERIPHERAL VASCULAR:  Pulses present and equally palpable; 2+ throughout.      Lab:     CBC:   Lab Results   Component  Value Date    WBC 10.0 01/28/2025    RBC 4.37 01/28/2025    HGB 13.9 01/28/2025    HCT 41.2 01/28/2025     01/28/2025        CMP:    Lab Results   Component Value Date     01/28/2025    K 3.7 01/28/2025     01/28/2025    CO2 27 01/28/2025    BUN 12 01/28/2025    CREATININE 0.82 01/28/2025    GLUCOSE 83 01/28/2025    CALCIUM 8.5 (L) 01/28/2025       Lipid Profile:    Lab Results   Component Value Date    TRIG 80 01/28/2025    HDL 45 01/28/2025    LDLCALC 52 01/28/2025       BMP:  Lab Results   Component Value Date     01/28/2025     08/08/2024     02/15/2024     09/15/2023     06/18/2020     (L) 09/24/2019    K 3.7 01/28/2025    K 4.3 08/08/2024    K 4.1 02/15/2024    K 4.2 09/15/2023    K 4.0 06/18/2020    K 4.0 09/24/2019     01/28/2025     08/08/2024     02/15/2024     09/15/2023    CL 98 06/18/2020    CL 99 09/24/2019    CO2 27 01/28/2025    CO2 29 08/08/2024    CO2 30 02/15/2024    CO2 27 09/15/2023    CO2 31 06/18/2020    CO2 29 09/24/2019    BUN 12 01/28/2025    BUN 16 08/08/2024    BUN 14 02/15/2024    BUN 13 09/15/2023    BUN 15 06/18/2020    BUN 12 09/24/2019    CREATININE 0.82 01/28/2025    CREATININE 0.85 08/08/2024    CREATININE 0.90 02/15/2024    CREATININE 0.88 09/15/2023    CREATININE 0.93 06/18/2020    CREATININE 0.85 09/24/2019       CBC:  Lab Results   Component Value Date    WBC 10.0 01/28/2025    WBC 8.7 08/08/2024    WBC 9.3 02/15/2024    WBC 8.3 09/15/2023    WBC 7.9 09/24/2019    RBC 4.37 01/28/2025    RBC 4.21 (L) 08/08/2024    RBC 4.45 (L) 02/15/2024    RBC 4.50 09/15/2023    RBC 4.31 (L) 09/24/2019    HGB 13.9 01/28/2025    HGB 13.6 08/08/2024    HGB 14.3 02/15/2024    HGB 14.3 09/15/2023    HGB 13.7 09/24/2019    HCT 41.2 01/28/2025    HCT 40.1 (L) 08/08/2024    HCT 41.9 02/15/2024    HCT 42.3 09/15/2023    HCT 41.2 09/24/2019    MCV 94.3 01/28/2025    MCV 95 08/08/2024    MCV 94 02/15/2024    MCV 94 09/15/2023     MCV 96 09/24/2019    MCH 31.8 01/28/2025    MCH 32.3 08/08/2024    MCH 32.1 02/15/2024    MCHC 33.7 01/28/2025    MCHC 33.9 08/08/2024    MCHC 34.1 02/15/2024    MCHC 33.8 09/15/2023    MCHC 33.3 09/24/2019    RDW 11.9 01/28/2025    RDW 12.4 08/08/2024    RDW 12.0 02/15/2024    RDW 12.0 09/15/2023    RDW 12.3 09/24/2019     01/28/2025     08/08/2024     02/15/2024     (L) 09/15/2023     (L) 09/24/2019    MPV 10.0 01/28/2025       Hepatic Function Panel:    Lab Results   Component Value Date    ALKPHOS 109 01/28/2025    ALT 14 01/28/2025    AST 16 01/28/2025    PROT 7.0 01/28/2025    BILITOT 0.9 01/28/2025       Magnesium:    Lab Results   Component Value Date    MG 2.06 02/15/2024       TSH:    Lab Results   Component Value Date    TSH 1.53 02/15/2024       Asessment:     Problem List Items Addressed This Visit           ICD-10-CM    Primary hypertension I10    Relevant Medications    amLODIPine (Norvasc) 2.5 mg tablet    furosemide (Lasix) 20 mg tablet (Start on 4/25/2025)    Other Relevant Orders    Follow Up In Cardiology    CBC    Comprehensive Metabolic Panel    Mixed hyperlipidemia E78.2    Relevant Medications    atorvastatin (Lipitor) 40 mg tablet    Other Relevant Orders    Follow Up In Cardiology    Lipid Panel    CBC    Comprehensive Metabolic Panel    Permanent atrial fibrillation (Multi) I48.21    Relevant Medications    amLODIPine (Norvasc) 2.5 mg tablet    apixaban (Eliquis) 5 mg tablet    metoprolol succinate XL (Toprol-XL) 100 mg 24 hr tablet    Other Relevant Orders    Follow Up In Cardiology    [facility]    Comprehensive Metabolic Panel    Venous insufficiency I87.2    Relevant Medications    furosemide (Lasix) 20 mg tablet (Start on 4/25/2025)     Other Visit Diagnoses         Codes      Congestive heart failure, unspecified HF chronicity, unspecified heart failure type     I50.9    Relevant Medications    amLODIPine (Norvasc) 2.5 mg tablet    metoprolol succinate  XL (Toprol-XL) 100 mg 24 hr tablet          I, Cydney Dickey RN, am scribing for, and in the presence of Dr. Jose Alfredo Andersen MD, [facility].    I, Dr. Jose Alfredo Andersen MD, [facility], personally performed the services described in the documentation as scribed by Cydney Dickey RN, in my presence, and confirm it is both accurate and complete.      Provider Attestation - Scribe documentation    The above portion of this note was dictated by me using voice recognition software.  All medical record entries made by the scribe were at my direction.  The scribe entering the documentation was in my presence.   I have reviewed the chart and agree that the record accurately reflects my personal performance of the history, physical exam, discussion and plan.            [1]   Allergies  Allergen Reactions    Nisoldipine Itching and Swelling    Simvastatin Other     Stopped due to a potential interaction with Amlodipine    Carvedilol Rash    Clonidine Rash    Dabigatran Etexilate Rash    Hydrochlorothiazide Rash    Losartan Rash    Nitroglycerin Rash    Rivaroxaban Rash

## 2025-04-24 NOTE — PATIENT INSTRUCTIONS
FOLLOW UP WITH Dr. Jay Jay Andersen MD, [facility] IN 6 MONTHS    Fasting labs to be done at least one week prior to your next appointment.   (Fast at least 8 hours prior to getting labs drawn. Water is okay)     DID YOU KNOW  We have a pharmacy here in the [facility].  They can fill all prescriptions, not just cardiac medications.  Prescriptions from other pharmacies can easily be transferred to the  pharmacy by the  pharmacist on site.   pharmacies offer FREE HOME DELIVERY on medications to anywhere in [place]. They can sync your medications. Typically prescriptions can be ready in 10 - 15 minutes. If pharmacy is unable to fill your  prescription or if cost is more than your paying now the Pharmacist can easily transfer back to your Pharmacy of choice. Pharmacy phone # [phone].     Please bring all medicines, vitamins, and herbal supplements with you in original bottles to every appointment! This is the best way to ensure your medication list in your chart is accurate.    Prescriptions will not be filled unless you are compliant with your follow up appointments or have a follow up appointment scheduled as per instruction of your physician. Refills should be requested at the time of your visit.

## 2025-07-30 DIAGNOSIS — I10 PRIMARY HYPERTENSION: ICD-10-CM

## 2025-07-30 DIAGNOSIS — I87.2 VENOUS INSUFFICIENCY: ICD-10-CM

## 2025-08-01 RX ORDER — TELMISARTAN 80 MG/1
80 TABLET ORAL DAILY
Qty: 90 TABLET | Refills: 1 | Status: SHIPPED | OUTPATIENT
Start: 2025-08-01 | End: 2025-08-05 | Stop reason: SDUPTHER

## 2025-11-03 ENCOUNTER — APPOINTMENT (OUTPATIENT)
Dept: CARDIOLOGY | Facility: CLINIC | Age: 85
End: 2025-11-03
Payer: MEDICARE

## 2026-03-24 ENCOUNTER — APPOINTMENT (OUTPATIENT)
Dept: PRIMARY CARE | Facility: CLINIC | Age: 86
End: 2026-03-24
Payer: MEDICARE

## (undated) DEVICE — SYRINGE MED 10ML LUERLOCK TIP W/O SFTY DISP

## (undated) DEVICE — ELECTRODE PT RET AD L9FT HI MOIST COND ADH HYDRGEL CORDED

## (undated) DEVICE — LABEL MED MINI W/ MARKER

## (undated) DEVICE — PACK,LAPAROTOMY,NO GOWNS: Brand: MEDLINE

## (undated) DEVICE — Z DISCONTINUED NO SUB IDED DRAIN PENROSE L12IN 0.25IN USED TO PROMOTE DRNAGE IN OPN

## (undated) DEVICE — PENCIL ES L3M BTTN SWCH HOLSTER W/ BLDE ELECTRD EDGE

## (undated) DEVICE — 2000CC GUARDIAN II: Brand: GUARDIAN

## (undated) DEVICE — GOWN,AURORA,NONREINFORCED,LARGE: Brand: MEDLINE

## (undated) DEVICE — SYRINGE BLB 50CC IRRIG PLIABLE FNGR FLNG GRAD FLSK DISP

## (undated) DEVICE — INTENDED FOR TISSUE SEPARATION, AND OTHER PROCEDURES THAT REQUIRE A SHARP SURGICAL BLADE TO PUNCTURE OR CUT.: Brand: BARD-PARKER ® CARBON RIB-BACK BLADES

## (undated) DEVICE — SUTURE VCRL SZ 0 L36IN ABSRB UD L36MM CT-1 1/2 CIR J946H

## (undated) DEVICE — SUPER SPONGES,MEDIUM: Brand: KERLIX

## (undated) DEVICE — SUTURE ETHLN SZ 3-0 L18IN NONABSORBABLE BLK L24MM PS-1 3/8 1663G

## (undated) DEVICE — MEDI-VAC NON-CONDUCTIVE SUCTION TUBING: Brand: CARDINAL HEALTH

## (undated) DEVICE — 1842 FOAM BLOCK NEEDLE COUNTER: Brand: DEVON

## (undated) DEVICE — CHLORAPREP 26ML ORANGE

## (undated) DEVICE — STERILE LATEX POWDER-FREE SURGICAL GLOVESWITH NITRILE COATING: Brand: PROTEXIS

## (undated) DEVICE — SPONGE: LAP 4X18 W XR 200/CS: Brand: MEDICAL ACTION INDUSTRIES

## (undated) DEVICE — SKIN MARKER,REGULAR TIP WITH RULER: Brand: DEVON

## (undated) DEVICE — NEEDLE HYPO 25GA L1.5IN BLU POLYPR HUB S STL REG BVL STR